# Patient Record
Sex: FEMALE | Race: WHITE | NOT HISPANIC OR LATINO | Employment: FULL TIME | URBAN - METROPOLITAN AREA
[De-identification: names, ages, dates, MRNs, and addresses within clinical notes are randomized per-mention and may not be internally consistent; named-entity substitution may affect disease eponyms.]

---

## 2019-12-18 ENCOUNTER — TELEPHONE (OUTPATIENT)
Dept: SCHEDULING | Facility: IMAGING CENTER | Age: 39
End: 2019-12-18

## 2019-12-30 ENCOUNTER — OFFICE VISIT (OUTPATIENT)
Dept: MEDICAL GROUP | Facility: PHYSICIAN GROUP | Age: 39
End: 2019-12-30
Payer: COMMERCIAL

## 2019-12-30 VITALS
RESPIRATION RATE: 18 BRPM | BODY MASS INDEX: 29.3 KG/M2 | SYSTOLIC BLOOD PRESSURE: 132 MMHG | TEMPERATURE: 98 F | HEIGHT: 62 IN | WEIGHT: 159.2 LBS | OXYGEN SATURATION: 95 % | DIASTOLIC BLOOD PRESSURE: 90 MMHG | HEART RATE: 75 BPM

## 2019-12-30 DIAGNOSIS — J02.9 PHARYNGITIS, UNSPECIFIED ETIOLOGY: ICD-10-CM

## 2019-12-30 DIAGNOSIS — Z23 NEED FOR VACCINATION: ICD-10-CM

## 2019-12-30 DIAGNOSIS — Z12.31 ENCOUNTER FOR SCREENING MAMMOGRAM FOR BREAST CANCER: ICD-10-CM

## 2019-12-30 DIAGNOSIS — Z00.00 ANNUAL PHYSICAL EXAM: ICD-10-CM

## 2019-12-30 DIAGNOSIS — R59.0 ANTERIOR CERVICAL ADENOPATHY: ICD-10-CM

## 2019-12-30 DIAGNOSIS — H61.23 BILATERAL IMPACTED CERUMEN: ICD-10-CM

## 2019-12-30 DIAGNOSIS — F41.9 ANXIETY: ICD-10-CM

## 2019-12-30 LAB
HETEROPH AB SER QL LA: NORMAL
INT CON NEG: NEGATIVE
INT CON NEG: NEGATIVE
INT CON POS: POSITIVE
INT CON POS: POSITIVE
S PYO AG THROAT QL: NORMAL

## 2019-12-30 PROCEDURE — 86308 HETEROPHILE ANTIBODY SCREEN: CPT | Performed by: NURSE PRACTITIONER

## 2019-12-30 PROCEDURE — 99204 OFFICE O/P NEW MOD 45 MIN: CPT | Performed by: NURSE PRACTITIONER

## 2019-12-30 PROCEDURE — 87880 STREP A ASSAY W/OPTIC: CPT | Performed by: NURSE PRACTITIONER

## 2019-12-30 RX ORDER — CETIRIZINE HYDROCHLORIDE 10 MG/1
10 TABLET ORAL DAILY
COMMUNITY

## 2019-12-30 SDOH — HEALTH STABILITY: MENTAL HEALTH: HOW OFTEN DO YOU HAVE 6 OR MORE DRINKS ON ONE OCCASION?: MONTHLY

## 2019-12-30 SDOH — HEALTH STABILITY: MENTAL HEALTH: HOW MANY STANDARD DRINKS CONTAINING ALCOHOL DO YOU HAVE ON A TYPICAL DAY?: 1 OR 2

## 2019-12-30 SDOH — HEALTH STABILITY: PHYSICAL HEALTH: ON AVERAGE, HOW MANY DAYS PER WEEK DO YOU ENGAGE IN MODERATE TO STRENUOUS EXERCISE (LIKE A BRISK WALK)?: 7 DAYS

## 2019-12-30 SDOH — HEALTH STABILITY: MENTAL HEALTH: HOW OFTEN DO YOU HAVE A DRINK CONTAINING ALCOHOL?: MONTHLY OR LESS

## 2019-12-30 SDOH — HEALTH STABILITY: PHYSICAL HEALTH: ON AVERAGE, HOW MANY MINUTES DO YOU ENGAGE IN EXERCISE AT THIS LEVEL?: 20 MIN

## 2019-12-30 ASSESSMENT — ANXIETY QUESTIONNAIRES
5. BEING SO RESTLESS THAT IT IS HARD TO SIT STILL: MORE THAN HALF THE DAYS
4. TROUBLE RELAXING: MORE THAN HALF THE DAYS
7. FEELING AFRAID AS IF SOMETHING AWFUL MIGHT HAPPEN: SEVERAL DAYS
1. FEELING NERVOUS, ANXIOUS, OR ON EDGE: SEVERAL DAYS
2. NOT BEING ABLE TO STOP OR CONTROL WORRYING: MORE THAN HALF THE DAYS
GAD7 TOTAL SCORE: 11
3. WORRYING TOO MUCH ABOUT DIFFERENT THINGS: MORE THAN HALF THE DAYS
6. BECOMING EASILY ANNOYED OR IRRITABLE: SEVERAL DAYS

## 2019-12-30 ASSESSMENT — ENCOUNTER SYMPTOMS
PALPITATIONS: 0
CHILLS: 0
NERVOUS/ANXIOUS: 1
ABDOMINAL PAIN: 0
DEPRESSION: 0
BLURRED VISION: 0
FEVER: 0
SORE THROAT: 1
BLOOD IN STOOL: 0
SHORTNESS OF BREATH: 0

## 2019-12-30 ASSESSMENT — PATIENT HEALTH QUESTIONNAIRE - PHQ9: CLINICAL INTERPRETATION OF PHQ2 SCORE: 0

## 2019-12-30 NOTE — ASSESSMENT & PLAN NOTE
This is a new problem. Onset began over 2 months ago. Her symptoms have been constant. Her symptoms include sore throat, ear congestion, fatigue, and enlarged lymph nodes. She denies fever, chills, or weight loss. She does have hx of seasonal allergies which she treats with OTC Zyrtec and benadryl.

## 2019-12-30 NOTE — LETTER
[facility]  ZEINA Hall  [address]  [facility] [address]  Fax: [phone]   Authorization for Release/Disclosure of   Protected Health Information   Name: REESE AMADO : 1980 SSN: [id number]   Address: [address] [place] [address] Phone:    [phone] (home)    I authorize the entity listed below to release/disclose the PHI below to:   [facility]/ZEINA Hall and ZEINA Hall   Provider or Entity Name:     Address   City, State, Zip   Phone:      Fax:     Reason for request: continuity of care   Information to be released:    [  ] LAST COLONOSCOPY,  including any PATH REPORT and follow-up  [  ] LAST FIT/COLOGUARD RESULT [  ] LAST DEXA  [  ] LAST MAMMOGRAM  [  ] LAST PAP  [  ] LAST LABS [  ] RETINA EXAM REPORT  [  ] IMMUNIZATION RECORDS  [  ] Release all info      [  ] Check here and initial the line next to each item to release ALL health information INCLUDING  _____ Care and treatment for drug and / or alcohol abuse  _____ HIV testing, infection status, or AIDS  _____ Genetic Testing    DATES OF SERVICE OR TIME PERIOD TO BE DISCLOSED: _____________  I understand and acknowledge that:  * This Authorization may be revoked at any time by you in writing, except if your health information has already been used or disclosed.  * Your health information that will be used or disclosed as a result of you signing this authorization could be re-disclosed by the recipient. If this occurs, your re-disclosed health information may no longer be protected by State or Federal laws.  * You may refuse to sign this Authorization. Your refusal will not affect your ability to obtain treatment.  * This Authorization becomes effective upon signing and will  on (date) __________.      If no date is indicated, this Authorization will  one (1) year from the signature date.    Name: Reese Amado    Signature:   Date:     2019            PLEASE FAX REQUESTED RECORDS BACK TO: [phone]

## 2019-12-30 NOTE — ASSESSMENT & PLAN NOTE
This is a new problem. Onset began approx 6 months ago. Symptoms include excessive worrying and racing thoughts. Her symptoms are intermittent in nature. She is unsure what may have brought on her symptoms. Aggravated by in social situations/crowded environments. She has been managing symptoms through physical activity and prayer. She does reports history of postpartum depression that was managed with antidepressant. She is not interested in medication management.

## 2019-12-30 NOTE — ASSESSMENT & PLAN NOTE
This is a new problem. Onset began over 2 months ago. Her symptoms have been constant. Associated symptoms include ear congestion and hearing loss, sore throat. She hasn't tried anything for her symptoms.

## 2019-12-30 NOTE — PROGRESS NOTES
New Patient appointment    Trisha Amado is a 39 y.o. female here to establish care. Her previous PCP was none. She presents with the following concerns:    HPI:      Anterior cervical adenopathy  This is a new problem. Onset began over 2 months ago. Her symptoms have been constant. Associated symptoms include ear congestion and hearing loss, sore throat. She hasn't tried anything for her symptoms.    Anxiety  This is a new problem. Onset began approx 6 months ago. Symptoms include excessive worrying and racing thoughts. Her symptoms are intermittent in nature. She is unsure what may have brought on her symptoms. Aggravated by in social situations/crowded environments. She has been managing symptoms through physical activity and prayer. She does reports history of postpartum depression that was managed with antidepressant. She is not interested in medication management.    Pharyngitis  This is a new problem. Onset began over 2 months ago. Her symptoms have been constant. Her symptoms include sore throat, ear congestion, fatigue, and enlarged lymph nodes. She denies fever, chills, or weight loss. She does have hx of seasonal allergies which she treats with OTC Zyrtec and benadryl.     Current medicines (including changes today)  Current Outpatient Medications   Medication Sig Dispense Refill   • cetirizine (ZYRTEC) 10 MG Tab Take 10 mg by mouth every day.     • diphenhydrAMINE HCl (BENADRYL ALLERGY PO) Take 1 Tab by mouth every bedtime.     • Levonorgestrel (MIRENA, 52 MG, IU) by Intrauterine route.     • tetanus-dipth-acell pertussis (ADACEL) 5-2-15.5 LF-MCG/0.5 Suspension 0.5 mL by Intramuscular route Once PRN for up to 1 dose. 0.5 mL 0   • pneumococcal vaccine (PNEUMOVAX 23) 25 MCG/0.5ML Injection 0.5 mL by Intramuscular route Once PRN for up to 1 dose. 1 Vial 0     No current facility-administered medications for this visit.      She  has no past medical history on file.  She  has a past surgical history that  includes gyn surgery; appendectomy; and primary c section.  Social History     Tobacco Use   • Smoking status: Former Smoker     Packs/day: 0.50     Years: 20.00     Pack years: 10.00     Types: Cigarettes     Last attempt to quit: 2018     Years since quittin.0   • Smokeless tobacco: Never Used   Substance Use Topics   • Alcohol use: Yes     Frequency: Monthly or less     Drinks per session: 1 or 2     Binge frequency: Monthly     Comment: Rare   • Drug use: No     Social History     Patient does not qualify to have social determinant information on file (likely too young).   Social History Narrative   • Not on file     Family History   Problem Relation Age of Onset   • Depression Mother    • Depression Father    • No Known Problems Sister    • No Known Problems Brother    • Heart Disease Maternal Grandmother    • Arterial Aneurysm Maternal Grandfather    • Alzheimer's Disease Paternal Grandmother    • COPD Paternal Grandfather    • Lung Disease Paternal Grandfather    • No Known Problems Sister    • Other Brother         CP   • No Known Problems Daughter    • No Known Problems Son      No family status information on file.       Review of Systems   Constitutional: Positive for malaise/fatigue. Negative for chills and fever.   HENT: Positive for congestion, hearing loss and sore throat. Negative for ear discharge and ear pain.    Eyes: Negative for blurred vision.        She wears glasses   Respiratory: Negative for shortness of breath.    Cardiovascular: Negative for chest pain and palpitations.   Gastrointestinal: Negative for abdominal pain and blood in stool.   Psychiatric/Behavioral: Negative for depression and suicidal ideas. The patient is nervous/anxious.      All other systems reviewed and are negative    KARLEY 7 2019   Total Score 11       Interpretation of KARLEY 7 Total Score   Score Severity:  0-4 No Anxiety   5-9 Mild Anxiety  10-14 Moderate Anxiety  15-21 Severe Anxiety       Objective:  "    /90   Pulse 75   Temp 36.7 °C (98 °F) (Temporal)   Resp 18   Ht 1.575 m (5' 2\")   Wt 72.2 kg (159 lb 3.2 oz)   SpO2 95%  Body mass index is 29.12 kg/m².    Physical Exam:  Constitutional: Oriented to person, place, and time and well-developed, well-nourished, and in no distress.   HENT:   Head: Normocephalic and atraumatic.   Right Ear: Unable to visualize tympanic membrane due to cerumen impaction. External ear normal.   Left Ear: Unable to visualize tympanic membrane due to cerumen impaction. External ear normal.   Mouth/Throat: Oropharynx is clear and moist and mucous membranes are normal. No oropharyngeal exudate. Posterior oropharyngeal erythema present.   Eyes: Conjunctivae and EOM are normal. Pupils are equal, round, and reactive to light. She is wearing corrective glasses.  Neck: Normal range of motion. Neck supple. No thyromegaly present.   Cardiovascular: Normal rate, regular rhythm, normal heart sounds. Radial pulses intact. Exam reveals no friction rub. No murmur heard.  Pulmonary/Chest: Effort normal and breath sounds normal. No respiratory distress or use of accessory muscles. No wheezes, rhonchi, or rales.   Abdominal: Soft. Bowel sounds are normal. Exhibits no distension and no mass. There is no tenderness. No hepatosplenomegaly.    Musculoskeletal: Full range of motion. No deformity or swelling of joints. DTRs intact.   Lymphadenopathy:     Single pea-size lymph node palpated left anterior neck.  Neurological: Alert and oriented to person, place, and time. Gait normal.   Skin: Skin is warm and dry. No cyanosis. No edema.  Psychiatric: Mood, memory, affect and judgment normal.     Assessment and Plan:   The following treatment plan was discussed    1. Pharyngitis, unspecified etiology  Rapid strep and mono test negative. She declined strep culture. Her symptoms are likely viral or allergen etiology. Will continue to monitor. Continue taking OTC antihistamine.  - POCT Rapid Strep A  - " POCT Mononucleosis (mono)    2. Anterior cervical adenopathy  Labs ordered to determine infectious vs. Inflammatory process. Will continue to monitor.  - POCT Rapid Strep A  - POCT Mononucleosis (mono)    3. Anxiety  Labs ordered to r/o organic etiology, including thyroid disease. Will determine treatment plan based on these results    4. Bilateral impacted cerumen  Will reschedule patient for ear lavage.     5. Need for vaccination  She was given prescription to obtain at preferred pharmacy.  - tetanus-dipth-acell pertussis (ADACEL) 5-2-15.5 LF-MCG/0.5 Suspension; 0.5 mL by Intramuscular route Once PRN for up to 1 dose.  Dispense: 0.5 mL; Refill: 0  - pneumococcal vaccine (PNEUMOVAX 23) 25 MCG/0.5ML Injection; 0.5 mL by Intramuscular route Once PRN for up to 1 dose.  Dispense: 1 Vial; Refill: 0    6. Encounter for screening mammogram for breast cancer  - MA-SCREEN MAMMO W/CAD-BILAT; Future    7. Annual physical exam  Routine screening labs ordered.  - Comp Metabolic Panel; Future  - HEMOGLOBIN A1C; Future  - CBC WITH DIFFERENTIAL; Future  - Lipid Profile; Future  - TSH; Future  - FREE THYROXINE; Future    Records requested from OB/GYN specialist.    Followup: Return in about 1 week (around 1/6/2020), or if symptoms worsen or fail to improve, for For follow-up on labs/ ear lavage .

## 2020-01-08 ENCOUNTER — OFFICE VISIT (OUTPATIENT)
Dept: MEDICAL GROUP | Facility: PHYSICIAN GROUP | Age: 40
End: 2020-01-08
Payer: COMMERCIAL

## 2020-01-08 VITALS
BODY MASS INDEX: 29.26 KG/M2 | HEART RATE: 68 BPM | DIASTOLIC BLOOD PRESSURE: 72 MMHG | SYSTOLIC BLOOD PRESSURE: 122 MMHG | TEMPERATURE: 97.8 F | RESPIRATION RATE: 16 BRPM | HEIGHT: 62 IN | OXYGEN SATURATION: 100 % | WEIGHT: 159 LBS

## 2020-01-08 DIAGNOSIS — H61.23 BILATERAL IMPACTED CERUMEN: ICD-10-CM

## 2020-01-08 DIAGNOSIS — E78.2 MIXED HYPERLIPIDEMIA: ICD-10-CM

## 2020-01-08 PROBLEM — J02.9 PHARYNGITIS: Status: RESOLVED | Noted: 2019-12-30 | Resolved: 2020-01-08

## 2020-01-08 PROCEDURE — 99213 OFFICE O/P EST LOW 20 MIN: CPT | Performed by: NURSE PRACTITIONER

## 2020-01-08 ASSESSMENT — PATIENT HEALTH QUESTIONNAIRE - PHQ9: CLINICAL INTERPRETATION OF PHQ2 SCORE: 0

## 2020-01-08 ASSESSMENT — ENCOUNTER SYMPTOMS
CHILLS: 0
FEVER: 0

## 2020-01-08 NOTE — ASSESSMENT & PLAN NOTE
Impaction located both ears that was found with physical examination performed one week ago. Patient is experiencing hearing loss and ear congestion.

## 2020-01-08 NOTE — PROGRESS NOTES
Subjective:   Trisha Amado is a 39 y.o. female here today for follow up on lab work and cerumen impaction.    Mixed hyperlipidemia  Lab results drawn on 1/3/2020 show elevated total cholesterol of 249, HDL 76, triglycerides 70, and . She does admit to poor eating habits high in saturated fats and low in fruits and vegetables. She has recently implement dietary changes this week.     Bilateral impacted cerumen  Impaction located both ears that was found with physical examination performed one week ago. Patient is experiencing hearing loss and ear congestion.     Current medicines (including changes today)  Current Outpatient Medications   Medication Sig Dispense Refill   • cetirizine (ZYRTEC) 10 MG Tab Take 10 mg by mouth every day.     • diphenhydrAMINE HCl (BENADRYL ALLERGY PO) Take 1 Tab by mouth every bedtime.     • Levonorgestrel (MIRENA, 52 MG, IU) by Intrauterine route.     • tetanus-dipth-acell pertussis (ADACEL) 5-2-15.5 LF-MCG/0.5 Suspension 0.5 mL by Intramuscular route Once PRN for up to 1 dose. 0.5 mL 0   • pneumococcal vaccine (PNEUMOVAX 23) 25 MCG/0.5ML Injection 0.5 mL by Intramuscular route Once PRN for up to 1 dose. 1 Vial 0     No current facility-administered medications for this visit.      She  has no past medical history on file.    Social History     Socioeconomic History   • Marital status:      Spouse name: Not on file   • Number of children: Not on file   • Years of education: Not on file   • Highest education level: Not on file   Occupational History     Comment:    Social Needs   • Financial resource strain: Not on file   • Food insecurity:     Worry: Not on file     Inability: Not on file   • Transportation needs:     Medical: Not on file     Non-medical: Not on file   Tobacco Use   • Smoking status: Former Smoker     Packs/day: 0.50     Years: 20.00     Pack years: 10.00     Types: Cigarettes     Last attempt to quit: 12/30/2018     Years since  "quittin.0   • Smokeless tobacco: Never Used   Substance and Sexual Activity   • Alcohol use: Yes     Frequency: Monthly or less     Drinks per session: 1 or 2     Binge frequency: Monthly     Comment: Rare   • Drug use: No   • Sexual activity: Yes     Birth control/protection: I.U.D.     Comment: ; 10 years. Mirena IUD placed 2019   Lifestyle   • Physical activity:     Days per week: 7 days     Minutes per session: 20 min   • Stress: Not on file   Relationships   • Social connections:     Talks on phone: Not on file     Gets together: Not on file     Attends Pentecostalism service: Not on file     Active member of club or organization: Not on file     Attends meetings of clubs or organizations: Not on file     Relationship status: Not on file   • Intimate partner violence:     Fear of current or ex partner: Not on file     Emotionally abused: Not on file     Physically abused: Not on file     Forced sexual activity: Not on file   Other Topics Concern   • Not on file   Social History Narrative   • Not on file       Review of Systems   Constitutional: Negative for chills and fever.   HENT: Positive for congestion, hearing loss and tinnitus. Negative for ear discharge and ear pain.         Objective:     /72 (BP Location: Right arm, Patient Position: Sitting)   Pulse 68   Temp 36.6 °C (97.8 °F)   Resp 16   Ht 1.575 m (5' 2\")   Wt 72.1 kg (159 lb)   SpO2 100%  Body mass index is 29.08 kg/m².     Physical Exam:  Constitutional: Oriented to person, place, and time and well-developed, well-nourished, and in no distress.   HENT:   Head: Normocephalic and atraumatic.   Right Ear: Unable to visualize tympanic membrane due to cerumen impaction.  External ear normal.   Left Ear: Unable to visualize tympanic membrane due to cerumen impaction. External ear normal.   Eyes: Conjunctivae and EOM are normal. Pupils are equal, round, and reactive to light.   Neck: Normal range of motion. Neck supple.   Neurological: " Alert and oriented to person, place, and time. Gait normal.   Psychiatric: Mood, memory, affect and judgment normal.       Assessment and Plan:   The following treatment plan was discussed    1. Mixed hyperlipidemia  Reviewed labs with patient.  Strongly encouraged lifestyle changes including low saturated fat and high fiber diet, along with regular physical activity.  Due to elevated ALT, we will plan to repeat lipid panel in 3 months.  - Lipid Profile; Future    2. Bilateral impacted cerumen  Ear lavage performed with successful removal of cerumen bilaterally.  Recommend that patient use over-the-counter mineral oil with cottonball for 20 min weekly to prevent further impaction.      Followup: Return if symptoms worsen or fail to improve.

## 2020-01-08 NOTE — ASSESSMENT & PLAN NOTE
Lab results drawn on 1/3/2020 show elevated total cholesterol of 249, HDL 76, triglycerides 70, and . She does admit to poor eating habits high in saturated fats and low in fruits and vegetables. She has recently implement dietary changes this week.

## 2020-01-14 ENCOUNTER — TELEPHONE (OUTPATIENT)
Dept: MEDICAL GROUP | Facility: PHYSICIAN GROUP | Age: 40
End: 2020-01-14

## 2020-01-14 NOTE — TELEPHONE ENCOUNTER
Attempted to contact patient regarding upcoming visit and symptoms. Advised to have patient contact office.    ZEINA Hall,NISH-C     Universal Safety Interventions

## 2020-01-15 ENCOUNTER — OFFICE VISIT (OUTPATIENT)
Dept: MEDICAL GROUP | Facility: PHYSICIAN GROUP | Age: 40
End: 2020-01-15
Payer: COMMERCIAL

## 2020-01-15 VITALS
RESPIRATION RATE: 16 BRPM | BODY MASS INDEX: 29.22 KG/M2 | DIASTOLIC BLOOD PRESSURE: 78 MMHG | OXYGEN SATURATION: 94 % | WEIGHT: 158.8 LBS | HEIGHT: 62 IN | TEMPERATURE: 97.4 F | SYSTOLIC BLOOD PRESSURE: 110 MMHG | HEART RATE: 67 BPM

## 2020-01-15 DIAGNOSIS — J01.90 ACUTE BACTERIAL SINUSITIS: ICD-10-CM

## 2020-01-15 DIAGNOSIS — J02.9 PHARYNGITIS, UNSPECIFIED ETIOLOGY: ICD-10-CM

## 2020-01-15 DIAGNOSIS — B96.89 ACUTE BACTERIAL SINUSITIS: ICD-10-CM

## 2020-01-15 LAB
INT CON NEG: NORMAL
INT CON POS: NORMAL
S PYO AG THROAT QL: NEGATIVE

## 2020-01-15 PROCEDURE — 87880 STREP A ASSAY W/OPTIC: CPT | Performed by: NURSE PRACTITIONER

## 2020-01-15 PROCEDURE — 99214 OFFICE O/P EST MOD 30 MIN: CPT | Performed by: NURSE PRACTITIONER

## 2020-01-15 RX ORDER — AMOXICILLIN AND CLAVULANATE POTASSIUM 875; 125 MG/1; MG/1
1 TABLET, FILM COATED ORAL 2 TIMES DAILY
Qty: 14 TAB | Refills: 0 | Status: SHIPPED | OUTPATIENT
Start: 2020-01-15 | End: 2020-01-22

## 2020-01-15 RX ORDER — FLUTICASONE PROPIONATE 50 MCG
1-2 SPRAY, SUSPENSION (ML) NASAL 2 TIMES DAILY PRN
Qty: 1 BOTTLE | Refills: 1 | Status: SHIPPED | OUTPATIENT
Start: 2020-01-15 | End: 2023-07-18

## 2020-01-15 ASSESSMENT — ENCOUNTER SYMPTOMS
TROUBLE SWALLOWING: 0
SHORTNESS OF BREATH: 0
SINUS PRESSURE: 1
FEVER: 0
SINUS PAIN: 1
SORE THROAT: 1
CHILLS: 0
COUGH: 0
SPUTUM PRODUCTION: 0
HEADACHES: 1
VOMITING: 0

## 2020-01-15 NOTE — PROGRESS NOTES
Subjective:   Trisha Amado is a 39 y.o. female here today for c/o pharyngitis and sinus congestion.     Pharyngitis    This is a new problem. Episode onset: Over 2 weeks ago. The problem has been waxing and waning. There has been no fever. The pain is moderate. Associated symptoms include congestion and headaches. Pertinent negatives include no coughing, ear pain, shortness of breath, trouble swallowing or vomiting. She has tried NSAIDs for the symptoms. The treatment provided mild relief.   Sinus Problem   This is a new problem. The current episode started in the past 7 days. The problem has been gradually worsening since onset. There has been no fever. The pain is moderate. Associated symptoms include congestion, headaches, sinus pressure and a sore throat. Pertinent negatives include no chills, coughing, ear pain or shortness of breath. Treatments tried: ibuprofen. The treatment provided no relief.       Current medicines (including changes today)  Current Outpatient Medications   Medication Sig Dispense Refill   • fluticasone (FLONASE) 50 MCG/ACT nasal spray Spray 1-2 Sprays in nose 2 times a day as needed. 1 Bottle 1   • amoxicillin-clavulanate (AUGMENTIN) 875-125 MG Tab Take 1 Tab by mouth 2 times a day for 7 days. 14 Tab 0   • cetirizine (ZYRTEC) 10 MG Tab Take 10 mg by mouth every day.     • diphenhydrAMINE HCl (BENADRYL ALLERGY PO) Take 1 Tab by mouth every bedtime.     • Levonorgestrel (MIRENA, 52 MG, IU) by Intrauterine route.     • tetanus-dipth-acell pertussis (ADACEL) 5-2-15.5 LF-MCG/0.5 Suspension 0.5 mL by Intramuscular route Once PRN for up to 1 dose. 0.5 mL 0   • pneumococcal vaccine (PNEUMOVAX 23) 25 MCG/0.5ML Injection 0.5 mL by Intramuscular route Once PRN for up to 1 dose. 1 Vial 0     No current facility-administered medications for this visit.      She  has no past medical history on file.    Social History     Socioeconomic History   • Marital status:      Spouse name: Not on file   •  Number of children: Not on file   • Years of education: Not on file   • Highest education level: Not on file   Occupational History     Comment:    Social Needs   • Financial resource strain: Not on file   • Food insecurity:     Worry: Not on file     Inability: Not on file   • Transportation needs:     Medical: Not on file     Non-medical: Not on file   Tobacco Use   • Smoking status: Former Smoker     Packs/day: 0.50     Years: 20.00     Pack years: 10.00     Types: Cigarettes     Last attempt to quit: 2018     Years since quittin.0   • Smokeless tobacco: Never Used   Substance and Sexual Activity   • Alcohol use: Yes     Frequency: Monthly or less     Drinks per session: 1 or 2     Binge frequency: Monthly     Comment: Rare   • Drug use: No   • Sexual activity: Yes     Birth control/protection: I.U.D.     Comment: ; 10 years. Mirena IUD placed 2019   Lifestyle   • Physical activity:     Days per week: 7 days     Minutes per session: 20 min   • Stress: Not on file   Relationships   • Social connections:     Talks on phone: Not on file     Gets together: Not on file     Attends Zoroastrian service: Not on file     Active member of club or organization: Not on file     Attends meetings of clubs or organizations: Not on file     Relationship status: Not on file   • Intimate partner violence:     Fear of current or ex partner: Not on file     Emotionally abused: Not on file     Physically abused: Not on file     Forced sexual activity: Not on file   Other Topics Concern   • Not on file   Social History Narrative   • Not on file       Review of Systems   Constitutional: Positive for malaise/fatigue. Negative for chills and fever.   HENT: Positive for congestion, sinus pressure, sinus pain and sore throat. Negative for ear pain and trouble swallowing.    Respiratory: Negative for cough, sputum production and shortness of breath.    Cardiovascular: Negative for chest pain.  "  Gastrointestinal: Negative for vomiting.   Neurological: Positive for headaches.        Objective:     /78   Pulse 67   Temp 36.3 °C (97.4 °F) (Temporal)   Resp 16   Ht 1.575 m (5' 2\")   Wt 72 kg (158 lb 12.8 oz)   SpO2 94%  Body mass index is 29.04 kg/m².     Physical Exam:  Constitutional: Oriented to person, place, and time and well-developed, well-nourished, and in no distress.   HENT:   Head: Normocephalic and atraumatic.   Right Ear: Tympanic membrane and external ear normal.   Left Ear: Tympanic membrane and external ear normal.   Mouth/Throat: Oropharynx is clear and moist and mucous membranes are normal. No oropharyngeal exudate. Posterior oropharyngeal erythema.   Nose: nasal turbinates red and swollen.  Eyes: Conjunctivae and EOM are normal. Pupils are equal, round, and reactive to light.   Neck: Normal range of motion. Neck supple.  Cardiovascular: Normal rate, regular rhythm, normal heart sounds. Exam reveals no friction rub. No murmur heard.  Pulmonary/Chest: Effort normal and breath sounds normal. No respiratory distress or use of accessory muscles. No wheezes, rhonchi, or rales.   Lymphadenopathy:     No cervical adenopathy.   Neurological: Alert and oriented to person, place, and time.  Skin: Skin is warm and dry. No cyanosis. No edema.  Psychiatric: Mood, memory, affect and judgment normal.       Assessment and Plan:   The following treatment plan was discussed    1. Acute bacterial sinusitis  She was prescribed antibiotics x 7 days and flonase prn. I also recommend OTC oral decongestant, saline nasal rinse or netti pot, saline nasal spay, Vit C 1000mg daily. I also recommend probiotic daily during antibiotic treatment. Work note provided.  - fluticasone (FLONASE) 50 MCG/ACT nasal spray; Spray 1-2 Sprays in nose 2 times a day as needed.  Dispense: 1 Bottle; Refill: 1  - amoxicillin-clavulanate (AUGMENTIN) 875-125 MG Tab; Take 1 Tab by mouth 2 times a day for 7 days.  Dispense: 14 Tab; " Refill: 0    2. Pharyngitis, unspecified etiology  Rapid strep was negative. Symptoms most likely viral in nature. Explained that this is a self-limiting illness  usually lasting 7-10 days. Recommend symptomatic treatment, including tylenol or ibuprofen for pain/fever, decongestant, saline nasal spray, vitamin C, and humidifier at bedside. Increase fluids and get plenty of rest.    - POCT Rapid Strep A    Followup: Return if symptoms worsen or fail to improve.

## 2020-01-15 NOTE — LETTER
January 15, 2020         Patient: Trisha Amado   YOB: 1980   Date of Visit: 1/15/2020           To Whom it May Concern:    Trisha Amado was seen in my clinic on 1/15/2020. Please excuse her absence from work today 1/15/20. She may return to work on 1/16/20.    If you have any questions or concerns, please don't hesitate to call.        Sincerely,           ANDRE Hall.  Electronically Signed

## 2020-10-08 ENCOUNTER — NURSE TRIAGE (OUTPATIENT)
Dept: HEALTH INFORMATION MANAGEMENT | Facility: OTHER | Age: 40
End: 2020-10-08

## 2020-10-08 NOTE — TELEPHONE ENCOUNTER
Regarding: ADVICE  ----- Message from Amanda Merlino sent at 10/8/2020  8:10 AM PDT -----  GAYE LIRIANO PT/ [facility]/ SOME SWELLING IN  LOWER LEG/ PEBP   JUST NOTICE SWELLING TODAY- NEW

## 2020-10-08 NOTE — TELEPHONE ENCOUNTER
"  Reason for Disposition  • Cast on leg or ankle and has increasing pain    Additional Information  • Negative: Sounds like a life-threatening emergency to the triager  • Negative: Chest pain  • Negative: Small area of swelling and followed an insect bite to the area  • Negative: Followed a knee injury  • Negative: Ankle or foot injury  • Negative: Pregnant with leg swelling or edema  • Negative: Difficulty breathing at rest  • Negative: Entire foot is cool or blue in comparison to other side    Answer Assessment - Initial Assessment Questions  1. ONSET: \"When did the swelling start?\" (e.g., minutes, hours, days)      This am  2. LOCATION: \"What part of the leg is swollen?\"  \"Are both legs swollen or just one leg?\"      Left leg  3. SEVERITY: \"How bad is the swelling?\" (e.g., localized; mild, moderate, severe)   - Localized - small area of swelling localized to one leg   - MILD pedal edema - swelling limited to foot and ankle, pitting edema < 1/4 inch (6 mm) deep, rest and elevation eliminate most or all swelling   - MODERATE edema - swelling of lower leg to knee, pitting edema > 1/4 inch (6 mm) deep, rest and elevation only partially reduce swelling   - SEVERE edema - swelling extends above knee, facial or hand swelling present       moderate  4. REDNESS: \"Does the swelling look red or infected?\"      no  5. PAIN: \"Is the swelling painful to touch?\" If so, ask: \"How painful is it?\"   (Scale 1-10; mild, moderate or severe)      4  6. FEVER: \"Do you have a fever?\" If so, ask: \"What is it, how was it measured, and when did it start?\"       no  7. CAUSE: \"What do you think is causing the leg swelling?\"      unknown  8. MEDICAL HISTORY: \"Do you have a history of heart failure, kidney disease, liver failure, or cancer?\"      no  9. RECURRENT SYMPTOM: \"Have you had leg swelling before?\" If so, ask: \"When was the last time?\" \"What happened that time?\"      no  10. OTHER SYMPTOMS: \"Do you have any other symptoms?\" (e.g., " "chest pain, difficulty breathing)        no  11. PREGNANCY: \"Is there any chance you are pregnant?\" \"When was your last menstrual period?\"        no    Protocols used: LEG SWELLING AND EDEMA-A-OH    "

## 2020-11-24 ENCOUNTER — OFFICE VISIT (OUTPATIENT)
Dept: MEDICAL GROUP | Facility: PHYSICIAN GROUP | Age: 40
End: 2020-11-24
Payer: COMMERCIAL

## 2020-11-24 VITALS
HEART RATE: 59 BPM | WEIGHT: 157.2 LBS | DIASTOLIC BLOOD PRESSURE: 70 MMHG | RESPIRATION RATE: 12 BRPM | HEIGHT: 62 IN | BODY MASS INDEX: 28.93 KG/M2 | OXYGEN SATURATION: 97 % | SYSTOLIC BLOOD PRESSURE: 130 MMHG | TEMPERATURE: 99.2 F

## 2020-11-24 DIAGNOSIS — F41.0 PANIC ATTACK DUE TO EXCEPTIONAL STRESS: ICD-10-CM

## 2020-11-24 DIAGNOSIS — Z00.00 PHYSICAL EXAM, ANNUAL: ICD-10-CM

## 2020-11-24 DIAGNOSIS — J45.990 MILD EXERCISE-INDUCED ASTHMA: ICD-10-CM

## 2020-11-24 DIAGNOSIS — R07.89 OTHER CHEST PAIN: ICD-10-CM

## 2020-11-24 DIAGNOSIS — F43.0 PANIC ATTACK DUE TO EXCEPTIONAL STRESS: ICD-10-CM

## 2020-11-24 PROCEDURE — 99214 OFFICE O/P EST MOD 30 MIN: CPT | Performed by: PHYSICIAN ASSISTANT

## 2020-11-24 PROCEDURE — 93000 ELECTROCARDIOGRAM COMPLETE: CPT | Performed by: PHYSICIAN ASSISTANT

## 2020-11-24 RX ORDER — ALBUTEROL SULFATE 90 UG/1
2 AEROSOL, METERED RESPIRATORY (INHALATION) EVERY 6 HOURS PRN
Qty: 1 EACH | Refills: 0 | Status: SHIPPED | OUTPATIENT
Start: 2020-11-24 | End: 2020-12-24

## 2020-11-24 RX ORDER — ALPRAZOLAM 0.5 MG/1
0.5 TABLET ORAL 2 TIMES DAILY PRN
Qty: 30 TAB | Refills: 0 | Status: SHIPPED | OUTPATIENT
Start: 2020-11-24 | End: 2020-12-24

## 2020-11-24 NOTE — PROGRESS NOTES
CC:    Chief Complaint   Patient presents with   • Establish Care     1 month ago in hospital    • Asthma     walking, get short breath   • Menopause     gets anxiety, 6 months ago    • Contraception       HISTORY OF THE PRESENT ILLNESS: Patient is a 40 y.o. female presenting to [facility] primary care     1. Pt worried that she is going into menopause because she is having increased anxiety and irritability. She is thinking possibly menopause related because that is what her mother suggested. She is now working from home and balancing life with kids schooling at home. Had Mirena inserted 2 years ago. Does not get periods since having Mirena.   2. Pt had asthma as a kid but it went away. Wondering if it is coming back. She is getting very SOB when even just going for a walk. She is getting wheezing with exercise.   3. Pt is having some CP and palpitations. No hx of cardiac problems. It seems to typically co-inside with moments of acute anxiety.       Allergies: Aspirin    Current Outpatient Medications Ordered in Epic   Medication Sig Dispense Refill   • fluticasone (FLONASE) 50 MCG/ACT nasal spray Spray 1-2 Sprays in nose 2 times a day as needed. 1 Bottle 1   • cetirizine (ZYRTEC) 10 MG Tab Take 10 mg by mouth every day.     • diphenhydrAMINE HCl (BENADRYL ALLERGY PO) Take 1 Tab by mouth every bedtime.     • Levonorgestrel (MIRENA, 52 MG, IU) by Intrauterine route.       No current [facility]-ordered facility-administered medications on file.        History reviewed. No pertinent past medical history.    Past Surgical History:   Procedure Laterality Date   • APPENDECTOMY     • GYN SURGERY      c section x 2   • PRIMARY C SECTION      x2       Social History     Tobacco Use   • Smoking status: Former Smoker     Packs/day: 0.50     Years: 20.00     Pack years: 10.00     Types: Cigarettes     Quit date: 2018     Years since quittin.9   • Smokeless tobacco: Never Used   Substance Use Topics   • Alcohol use: Yes      Frequency: Monthly or less     Drinks per session: 1 or 2     Binge frequency: Monthly     Comment: Rare   • Drug use: No       Social History     Social History Narrative   • Not on file       Family History   Problem Relation Age of Onset   • Depression Mother    • Depression Father    • No Known Problems Sister    • No Known Problems Brother    • Heart Disease Maternal Grandmother    • Arterial Aneurysm Maternal Grandfather    • Alzheimer's Disease Paternal Grandmother    • COPD Paternal Grandfather    • Lung Disease Paternal Grandfather    • No Known Problems Sister    • Other Brother         CP   • No Known Problems Daughter    • No Known Problems Son        ROS:     - Constitutional: Negative for fever, chills, unexpected weight change, and fatigue/generalized weakness.     - HEENT: Negative for headaches, vision changes, hearing changes, ear pain, ear discharge, rhinorrhea, sinus congestion, sore throat, and neck pain.      - Respiratory: Positive for wheezing and shortness of breath with exertion.  Negative for cough, sputum production, chest congestion,, and crackles.      - Cardiovascular: Positive chest pain and palpitations.  Negative for  orthopnea, and bilateral lower extremity edema.     - Gastrointestinal: Negative for heartburn, nausea, vomiting, abdominal pain, hematochezia, melena, diarrhea, constipation, and greasy/foul-smelling stools.     - Genitourinary: Negative for dysuria, polyuria, hematuria, pyuria, urinary urgency, and urinary incontinence.     - Musculoskeletal: Negative for myalgias, back pain, and joint pain.     - Skin: Negative for rash, itching, cyanotic skin color change.     - Neurological: Negative for dizziness, tingling, tremors, focal sensory deficit, focal weakness and headaches.     - Endo/Heme/Allergies: Does not bruise/bleed easily.     - Psychiatric/Behavioral: Positive for anxiety.  Negative for depression, suicidal/homicidal ideation and memory loss.        - NOTE: All  "other systems reviewed and are negative, except as in HPI.      .      Exam: /70 (BP Location: Right arm, Patient Position: Sitting, BP Cuff Size: Adult)   Pulse (!) 59   Temp 37.3 °C (99.2 °F) (Temporal)   Resp 12   Ht 1.575 m (5' 2\")   Wt 71.3 kg (157 lb 3.2 oz)   SpO2 97%  Body mass index is 28.75 kg/m².    General: Normal appearing. No acute distress.  Skin: Warm and dry.  No obvious lesions.  HEENT: Normocephalic. Eyes conjunctiva clear lids without ptosis, ears normal shape and contour  Cardiovascular: Regular rate and rhythm without murmur.   Respiratory: Clear to auscultation bilaterally, no rhonchi wheezing or rales.  Neurologic: Grossly nonfocal, A&O x3, gait normal,  Musculoskeletal: No deformity or swelling.   Extremities: No extremity cyanosis, clubbing, or edema.  Psych: Pt emotionally upset, crying in room while discussing her situation. Judgment and insight is normal.    Please note that this dictation was created using voice recognition software. I have made every reasonable attempt to correct obvious errors, but I expect that there are errors of grammar and possibly content that I did not discover before finalizing the note.  EKG Interpretation   Interpreted by me   Rhythm: normal sinus   Rate: normal   Axis: normal   Ectopy: none   Conduction: normal   ST Segments: no acute change   T Waves: no acute change   Q Waves: none   Clinical Impression: no acute changes and normal EKG      Assessment/Plan  1. Physical exam, annual  Annual labs printed  - CBC WITH DIFFERENTIAL; Future  - Comp Metabolic Panel; Future  - Lipid Profile; Future  - TSH WITH REFLEX TO FT4; Future    2. Other chest pain  EKG normal. This is likely stemming from her increased anxiety. She is low risk for acute coronary syndrome. Go to ER if pain becomes more severe.   - EKG - Clinic Performed    3. Panic attack due to exceptional stress  I am going to start her on as needed Xanax for panic attacks. PDMP checked. Will " check back in 2 months to see if this is helping. We also discussed how she is likely having increased panic attacks from what sounds like burnout. The best medicine now would be to start making space and time for herself to rest.  - ALPRAZolam (XANAX) 0.5 MG Tab; Take 1 Tab by mouth 2 times a day as needed for Sleep or Anxiety for up to 30 days.  Dispense: 30 Tab; Refill: 0    4. Mild exercise-induced asthma  Take albuterol PRN for SOB  - albuterol 108 (90 Base) MCG/ACT Aero Soln inhalation aerosol; Inhale 2 Puffs every 6 hours as needed for Shortness of Breath for up to 30 days.  Dispense: 1 Each; Refill: 0

## 2020-12-22 ENCOUNTER — OFFICE VISIT (OUTPATIENT)
Dept: MEDICAL GROUP | Facility: PHYSICIAN GROUP | Age: 40
End: 2020-12-22
Payer: COMMERCIAL

## 2020-12-22 VITALS
HEIGHT: 61 IN | OXYGEN SATURATION: 94 % | TEMPERATURE: 98.5 F | DIASTOLIC BLOOD PRESSURE: 82 MMHG | SYSTOLIC BLOOD PRESSURE: 110 MMHG | WEIGHT: 161 LBS | BODY MASS INDEX: 30.4 KG/M2 | HEART RATE: 61 BPM | RESPIRATION RATE: 16 BRPM

## 2020-12-22 DIAGNOSIS — R10.11 RUQ PAIN: ICD-10-CM

## 2020-12-22 PROCEDURE — 99214 OFFICE O/P EST MOD 30 MIN: CPT | Performed by: NURSE PRACTITIONER

## 2020-12-23 NOTE — ASSESSMENT & PLAN NOTE
New problem. RUQ pain started x 1 wk ago. Pt started keto diet a few weeks prior to her symptoms.  Pain is intermittent, localized, nonradiating.  It sometimes wakes her up at night.  Pain gets worse with food ingestion, particularly fats, gets better with fasting and heat packs to the affected site. Taking NSAIDs OTC which are helping. She denies fever, chills, fatty stools, nausea, diarrhea.  No back pain, abdominal distention or jaundice, no hematochezia or melena.  Gallbladder intact.  Patient did not have a chance to complete her lab work yet, requesting print out of the lab orders.

## 2020-12-23 NOTE — PROGRESS NOTES
Chief Complaint   Patient presents with   • RUQ Pain      been dieting        HISTORY OF PRESENT ILLNESS: Patient is a 40 y.o. female, established patient who presents today to discuss medical problems as listed below:    RUQ pain  New problem. RUQ pain started x 1 wk ago. Pt started keto diet a few weeks prior to her symptoms.  Pain is intermittent, localized, nonradiating.  It sometimes wakes her up at night.  Pain gets worse with food ingestion, particularly fats gets better with fasting and heat packs to the affected site. Taking NSAIDs OTC which are helping. She denies fever, chills, fatty stools, nausea, diarrhea.  No abdominal distention or jaundice. Gallbladder intact.  Patient did not have a chance to complete her lab work yet, requesting print out of the lab orders.      Patient Active Problem List    Diagnosis Date Noted   • RUQ pain 12/22/2020   • Mixed hyperlipidemia 01/08/2020   • Bilateral impacted cerumen 01/08/2020   • Anterior cervical adenopathy 12/30/2019   • Anxiety 12/30/2019        Allergies: Aspirin    Current Outpatient Medications   Medication Sig Dispense Refill   • ALPRAZolam (XANAX) 0.5 MG Tab Take 1 Tab by mouth 2 times a day as needed for Sleep or Anxiety for up to 30 days. 30 Tab 0   • albuterol 108 (90 Base) MCG/ACT Aero Soln inhalation aerosol Inhale 2 Puffs every 6 hours as needed for Shortness of Breath for up to 30 days. 1 Each 0   • fluticasone (FLONASE) 50 MCG/ACT nasal spray Spray 1-2 Sprays in nose 2 times a day as needed. 1 Bottle 1   • cetirizine (ZYRTEC) 10 MG Tab Take 10 mg by mouth every day.     • diphenhydrAMINE HCl (BENADRYL ALLERGY PO) Take 1 Tab by mouth every bedtime.     • Levonorgestrel (MIRENA, 52 MG, IU) by Intrauterine route.       No current facility-administered medications for this visit.        Social History     Tobacco Use   • Smoking status: Former Smoker     Packs/day: 0.50     Years: 20.00     Pack years: 10.00     Types: Cigarettes     Quit date:  "2018     Years since quittin.9   • Smokeless tobacco: Never Used   Substance Use Topics   • Alcohol use: Yes     Frequency: Monthly or less     Drinks per session: 1 or 2     Binge frequency: Monthly     Comment: Rare   • Drug use: No     Social History     Social History Narrative   • Not on file       Family History   Problem Relation Age of Onset   • Depression Mother    • Depression Father    • No Known Problems Sister    • No Known Problems Brother    • Heart Disease Maternal Grandmother    • Arterial Aneurysm Maternal Grandfather    • Alzheimer's Disease Paternal Grandmother    • COPD Paternal Grandfather    • Lung Disease Paternal Grandfather    • No Known Problems Sister    • Other Brother         CP   • No Known Problems Daughter    • No Known Problems Son        Allergies, past medical history, past surgical history, family history, social history reviewed and updated.    Review of Systems:     - Constitutional: Negative for fever, chills, unexpected weight change, and fatigue/generalized weakness.     - Respiratory: Negative for cough, sputum production, chest congestion, dyspnea, wheezing, and crackles.      - Cardiovascular: Negative for chest pain, palpitations, orthopnea, and bilateral lower extremity edema.     - Gastrointestinal: RUQ pain      - Psychiatric/Behavioral: Negative for depression, suicidal/homicidal ideation and memory loss.      All other systems reviewed and are negative    Exam:    /82 (BP Location: Left arm, Patient Position: Sitting, BP Cuff Size: Adult)   Pulse 61   Temp 36.9 °C (98.5 °F) (Temporal)   Resp 16   Ht 1.549 m (5' 1\")   Wt 73 kg (161 lb)   SpO2 94%   BMI 30.42 kg/m²  Body mass index is 30.42 kg/m².    Physical Exam:  Constitutional: Well-developed and well-nourished. Not diaphoretic. No distress.   Cardiovascular: Regular rate and rhythm, S1 and S2 without murmur, rubs, or gallops.    Chest: Effort normal. Clear to auscultation throughout. No " adventitious sounds.   Abdomen: tenderness over RUQ in laying position and soft palpation. Soft, without distention. Active bowel sounds in all four quadrants. No rebound, guarding, masses or HSM.  Neurological: Alert and oriented x 3.   Psychiatric:  Behavior, mood, and affect are appropriate.  MA/nursing note and vitals reviewed.    Patient was seen for 25 minutes face to face of which > 50% of appointment time was spent on counseling and coordination of care regarding the above.     Assessment/Plan:  1. RUQ pain  Uncontrolled, stable.  Suspecting cholelithiasis.  Will obtain imaging for definitive diagnosis.  Recommend avoiding trigger foods such as fats and sugars.  Continue OTC NSAIDs, also recommend heat packs, adequate daily hydration with warm water, okay to add lemon to water.  If symptoms get worse go to ED.  Will obtain imaging and discuss findings with patient.  May need referral to general surgery if diagnosis confirms.  Patient will obtain labs ordered by PCP.  No additional labs needed at this time.  - US-RUQ; Future       Discussed with patient possible alternative diagnoses, pt is to take all medications as prescribed. If symptoms persist FU w/PCP, if symptoms worsen go to emergency room. If experiencing any side effects from prescribed medications reports to the office immediately or go to emergency room.  Reviewed indication, dosage, usage and potential adverse effects of prescribed medications. Reviewed risks and benefits of treatment plan. Patient verbalizes understanding of all instruction and verbally agrees to plan.    Return if symptoms worsen or fail to improve.

## 2020-12-28 ENCOUNTER — TELEMEDICINE (OUTPATIENT)
Dept: MEDICAL GROUP | Facility: PHYSICIAN GROUP | Age: 40
End: 2020-12-28
Payer: COMMERCIAL

## 2020-12-28 VITALS — BODY MASS INDEX: 30.4 KG/M2 | WEIGHT: 161 LBS | HEIGHT: 61 IN

## 2020-12-28 DIAGNOSIS — F41.9 ANXIETY: ICD-10-CM

## 2020-12-28 PROCEDURE — 99213 OFFICE O/P EST LOW 20 MIN: CPT | Mod: 95,CR | Performed by: PHYSICIAN ASSISTANT

## 2020-12-28 RX ORDER — FLUOXETINE HYDROCHLORIDE 20 MG/1
20 CAPSULE ORAL DAILY
Qty: 60 CAP | Refills: 0 | Status: SHIPPED
Start: 2020-12-28 | End: 2021-02-02

## 2020-12-28 RX ORDER — ALPRAZOLAM 0.5 MG/1
TABLET ORAL
Qty: 30 TAB | Refills: 0 | Status: SHIPPED | OUTPATIENT
Start: 2020-12-28 | End: 2021-02-02 | Stop reason: SDUPTHER

## 2020-12-28 NOTE — PROGRESS NOTES
Telemedicine Visit: Established Patient     This encounter was conducted via Zoom.   Verbal consent was obtained. Patient's identity was verified.    CC:  Chief Complaint   Patient presents with   • Medication Refill     xanax       HISTORY OF PRESENT ILLNESS: Patient is a 40 y.o. female established patient presenting for recheck of her anxiety.  She states she has been taking half a tablet of Xanax in the morning and a half a tablet in the evening before bed.  She is also been taking  afternoons off for herself and overall she is feeling like her anxiety is improving.  She is not having frequent chest pain episodes like she was previously.          Patient Active Problem List    Diagnosis Date Noted   • RUQ pain 2020   • Mixed hyperlipidemia 2020   • Bilateral impacted cerumen 2020   • Anterior cervical adenopathy 2019   • Anxiety 2019      Allergies:Aspirin    Current Outpatient Medications   Medication Sig Dispense Refill   • fluticasone (FLONASE) 50 MCG/ACT nasal spray Spray 1-2 Sprays in nose 2 times a day as needed. 1 Bottle 1   • cetirizine (ZYRTEC) 10 MG Tab Take 10 mg by mouth every day.     • diphenhydrAMINE HCl (BENADRYL ALLERGY PO) Take 1 Tab by mouth every bedtime.     • Levonorgestrel (MIRENA, 52 MG, IU) by Intrauterine route.       No current facility-administered medications for this visit.        Social History     Tobacco Use   • Smoking status: Former Smoker     Packs/day: 0.50     Years: 20.00     Pack years: 10.00     Types: Cigarettes     Quit date: 2018     Years since quittin.9   • Smokeless tobacco: Never Used   Substance Use Topics   • Alcohol use: Not Currently     Frequency: Monthly or less     Drinks per session: 1 or 2     Binge frequency: Monthly     Comment: Rare   • Drug use: No     Social History     Social History Narrative   • Not on file       Family History   Problem Relation Age of Onset   • Depression Mother    • Depression Father   "  • No Known Problems Sister    • No Known Problems Brother    • Heart Disease Maternal Grandmother    • Arterial Aneurysm Maternal Grandfather    • Alzheimer's Disease Paternal Grandmother    • COPD Paternal Grandfather    • Lung Disease Paternal Grandfather    • No Known Problems Sister    • Other Brother         CP   • No Known Problems Daughter    • No Known Problems Son         ROS:     - Constitutional:  Negative for fever, chills, unexpected weight change, and fatigue/generalized weakness.    - HEENT:  Negative for headaches, vision changes, hearing changes, ear pain, ear discharge, rhinorrhea, sinus congestion, sore throat, and neck pain.      - Respiratory: Negative for cough, sputum production, chest congestion, dyspnea, wheezing, and crackles.      - Cardiovascular: Negative for chest pain, palpitations, orthopnea, and bilateral lower extremity edema.     - Psychiatric/Behavioral:Positive for anxiety.  Negative for depression, suicidal/homicidal ideation and memory loss.          - NOTE: All other systems reviewed and are negative, except as in HPI.      Exam:    Ht 1.549 m (5' 1\")   Wt 73 kg (161 lb)  Body mass index is 30.42 kg/m².    General:  Well nourished, well developed female in NAD  Head is grossly normal.  Neck: Supple.   Pulmonary: No accessory muscle use  Skin: No apparent lesions  Neuro: Alert and oriented  Psych: normal mood and affect    Please note that this dictation was created using voice recognition software. I have made every reasonable attempt to correct obvious errors, but I expect that there are errors of grammar and possibly content that I did not discover before finalizing the note.        Assessment/Plan:  1. Anxiety  I explained to her that medication like Xanax is best used for just as needed use.  I prefer that she was on a better long term solution like Prozac.  She is agreeable to this and we will check this again in 6 weeks.  - FLUoxetine (PROZAC) 20 MG Cap; Take 1 Cap by " mouth every day for 60 days.  Dispense: 60 Cap; Refill: 0  - ALPRAZolam (XANAX) 0.5 MG Tab; Take 1 tablet by mouth once daily as needed for anxiety or sleep  Dispense: 30 Tab; Refill: 0

## 2020-12-30 ENCOUNTER — HOSPITAL ENCOUNTER (OUTPATIENT)
Dept: RADIOLOGY | Facility: MEDICAL CENTER | Age: 40
End: 2020-12-30
Attending: NURSE PRACTITIONER
Payer: COMMERCIAL

## 2020-12-30 DIAGNOSIS — R10.11 RUQ PAIN: ICD-10-CM

## 2020-12-30 PROCEDURE — 76705 ECHO EXAM OF ABDOMEN: CPT

## 2021-01-18 ENCOUNTER — APPOINTMENT (OUTPATIENT)
Dept: MEDICAL GROUP | Facility: PHYSICIAN GROUP | Age: 41
End: 2021-01-18
Payer: COMMERCIAL

## 2021-01-26 ENCOUNTER — APPOINTMENT (OUTPATIENT)
Dept: MEDICAL GROUP | Facility: PHYSICIAN GROUP | Age: 41
End: 2021-01-26
Payer: COMMERCIAL

## 2021-02-02 ENCOUNTER — TELEMEDICINE (OUTPATIENT)
Dept: MEDICAL GROUP | Facility: PHYSICIAN GROUP | Age: 41
End: 2021-02-02
Payer: COMMERCIAL

## 2021-02-02 DIAGNOSIS — F41.9 ANXIETY: ICD-10-CM

## 2021-02-02 PROCEDURE — 99213 OFFICE O/P EST LOW 20 MIN: CPT | Mod: 95,CR | Performed by: PHYSICIAN ASSISTANT

## 2021-02-02 RX ORDER — FLUOXETINE 10 MG/1
10 CAPSULE ORAL DAILY
Qty: 60 CAP | Refills: 0 | Status: SHIPPED | OUTPATIENT
Start: 2021-02-02 | End: 2021-03-17 | Stop reason: SDUPTHER

## 2021-02-02 RX ORDER — ALPRAZOLAM 0.5 MG/1
TABLET ORAL
Qty: 30 TAB | Refills: 0 | Status: SHIPPED | OUTPATIENT
Start: 2021-02-02 | End: 2021-03-17 | Stop reason: SDUPTHER

## 2021-02-02 ASSESSMENT — PATIENT HEALTH QUESTIONNAIRE - PHQ9: CLINICAL INTERPRETATION OF PHQ2 SCORE: 0

## 2021-02-02 NOTE — PROGRESS NOTES
Telemedicine Visit: Established Patient     This encounter was conducted via Zoom.   Verbal consent was obtained. Patient's identity was verified.    CC:  Chief Complaint   Patient presents with   • Follow-Up     medication       HISTORY OF PRESENT ILLNESS: Patient is a 40 y.o. female established patient presenting for recheck anxiety. She feels like the dose might be too high. Thinks her anxiety might increase because too jittery. She admits that she has taken all of her xanax in the past month because of her increased anxiety levels.     Pt had RUQ US done a few weeks ago which returned normal. Pain has resolved. Starting to eat better and exercise more.       No problem-specific Assessment & Plan notes found for this encounter.      Patient Active Problem List    Diagnosis Date Noted   • RUQ pain 2020   • Mixed hyperlipidemia 2020   • Bilateral impacted cerumen 2020   • Anterior cervical adenopathy 2019   • Anxiety 2019      Allergies:Aspirin    Current Outpatient Medications   Medication Sig Dispense Refill   • FLUoxetine (PROZAC) 20 MG Cap Take 1 Cap by mouth every day for 60 days. 60 Cap 0   • ALPRAZolam (XANAX) 0.5 MG Tab Take 1 tablet by mouth once daily as needed for anxiety or sleep 30 Tab 0   • fluticasone (FLONASE) 50 MCG/ACT nasal spray Spray 1-2 Sprays in nose 2 times a day as needed. 1 Bottle 1   • cetirizine (ZYRTEC) 10 MG Tab Take 10 mg by mouth every day.     • diphenhydrAMINE HCl (BENADRYL ALLERGY PO) Take 1 Tab by mouth every bedtime.     • Levonorgestrel (MIRENA, 52 MG, IU) by Intrauterine route.       No current facility-administered medications for this visit.        Social History     Tobacco Use   • Smoking status: Former Smoker     Packs/day: 0.50     Years: 20.00     Pack years: 10.00     Types: Cigarettes     Quit date: 2018     Years since quittin.0   • Smokeless tobacco: Never Used   Substance Use Topics   • Alcohol use: Not Currently      Frequency: Monthly or less     Drinks per session: 1 or 2     Binge frequency: Monthly     Comment: Rare   • Drug use: No     Social History     Social History Narrative   • Not on file       Family History   Problem Relation Age of Onset   • Depression Mother    • Depression Father    • No Known Problems Sister    • No Known Problems Brother    • Heart Disease Maternal Grandmother    • Arterial Aneurysm Maternal Grandfather    • Alzheimer's Disease Paternal Grandmother    • COPD Paternal Grandfather    • Lung Disease Paternal Grandfather    • No Known Problems Sister    • Other Brother         CP   • No Known Problems Daughter    • No Known Problems Son         ROS:     - Constitutional:  Negative for fever, chills, unexpected weight change, and fatigue/generalized weakness.    - HEENT:  Negative for headaches, vision changes, hearing changes, ear pain, ear discharge, rhinorrhea, sinus congestion, sore throat, and neck pain.      - Respiratory: Negative for cough, sputum production, chest congestion, dyspnea, wheezing, and crackles.      - Cardiovascular: Negative for chest pain, palpitations, orthopnea, and bilateral lower extremity edema.     - Gastrointestinal: Negative for heartburn, nausea, vomiting, abdominal pain, hematochezia, melena, diarrhea, constipation, and greasy/foul-smelling stools.     - Genitourinary: Negative for dysuria, polyuria, hematuria, pyuria, urinary urgency, and urinary incontinence.     - Musculoskeletal: Negative for myalgias, back pain, and joint pain.     - Skin: Negative for rash, itching, cyanotic skin color change.     - Neurological: Negative for dizziness, tingling, tremors, focal sensory deficit, focal weakness and headaches.     - Endo/Heme/Allergies: Does not bruise/bleed easily.     - Psychiatric/Behavioral:positive for anxiety.  Negative for depression, suicidal/homicidal ideation and memory loss.              Exam:    There were no vitals taken for this visit. There is no  height or weight on file to calculate BMI.    General:  Well nourished, well developed female in NAD  Head is grossly normal.  Neck: Supple.   Pulmonary: No accessory muscle use  Skin: No apparent lesions  Neuro: Alert and oriented  Psych: normal mood and affect    Please note that this dictation was created using voice recognition software. I have made every reasonable attempt to correct obvious errors, but I expect that there are errors of grammar and possibly content that I did not discover before finalizing the note.        Assessment/Plan:  1. Anxiety  We will reduce the dose to 10 mg and recheck again in 6 weeks.  PDMP checked  - FLUoxetine (PROZAC) 10 MG Cap; Take 1 Cap by mouth every day for 60 days.  Dispense: 60 Cap; Refill: 0  - ALPRAZolam (XANAX) 0.5 MG Tab; Take 1 tablet by mouth once daily as needed for anxiety or sleep  Dispense: 30 Tab; Refill: 0

## 2021-03-17 ENCOUNTER — TELEMEDICINE (OUTPATIENT)
Dept: MEDICAL GROUP | Facility: PHYSICIAN GROUP | Age: 41
End: 2021-03-17
Payer: COMMERCIAL

## 2021-03-17 DIAGNOSIS — R06.02 SOB (SHORTNESS OF BREATH): ICD-10-CM

## 2021-03-17 DIAGNOSIS — Z87.09 HISTORY OF URI (UPPER RESPIRATORY INFECTION): ICD-10-CM

## 2021-03-17 DIAGNOSIS — F41.9 ANXIETY: ICD-10-CM

## 2021-03-17 PROCEDURE — 99213 OFFICE O/P EST LOW 20 MIN: CPT | Mod: 95,CR | Performed by: PHYSICIAN ASSISTANT

## 2021-03-17 RX ORDER — FLUOXETINE 10 MG/1
10 CAPSULE ORAL DAILY
Qty: 90 CAPSULE | Refills: 1 | Status: SHIPPED | OUTPATIENT
Start: 2021-03-17 | End: 2021-06-29 | Stop reason: SDUPTHER

## 2021-03-17 RX ORDER — ALPRAZOLAM 0.5 MG/1
TABLET ORAL
Qty: 30 TABLET | Refills: 0 | Status: SHIPPED | OUTPATIENT
Start: 2021-03-17 | End: 2021-04-30

## 2021-03-17 RX ORDER — ALBUTEROL SULFATE 90 UG/1
2 AEROSOL, METERED RESPIRATORY (INHALATION) EVERY 4 HOURS PRN
Qty: 1 EACH | Refills: 0 | Status: SHIPPED | OUTPATIENT
Start: 2021-03-17 | End: 2021-06-15

## 2021-03-17 NOTE — PROGRESS NOTES
Telemedicine Visit: Established Patient     This encounter was conducted via Zoom.   Verbal consent was obtained. Patient's identity was verified.    CC:  Chief Complaint   Patient presents with   • Follow-Up     anti-depressant   • Medication Refill       HISTORY OF PRESENT ILLNESS: Patient is a 40 y.o. female established patient presenting for recheck.   Pt states her prozac dose at 10mg is excellent. She is not longer feeling super anxious and is less jittery than previously. She notes that she has had to reduce her caffeine consumption. She has been making more time for herself and is now going to the gym 5 days per week so she can have some personal time. She continues to take a half tab of xanax every couple of days for moments of stress.     Pt would like refill on inhaler for when she is doing exercise. Getting SOB.     Patient Active Problem List    Diagnosis Date Noted   • RUQ pain 2020   • Mixed hyperlipidemia 2020   • Bilateral impacted cerumen 2020   • Anterior cervical adenopathy 2019   • Anxiety 2019      Allergies:Aspirin    Current Outpatient Medications   Medication Sig Dispense Refill   • FLUoxetine (PROZAC) 10 MG Cap Take 1 Cap by mouth every day for 60 days. 60 Cap 0   • ALPRAZolam (XANAX) 0.5 MG Tab Take 1 tablet by mouth once daily as needed for anxiety or sleep 30 Tab 0   • fluticasone (FLONASE) 50 MCG/ACT nasal spray Spray 1-2 Sprays in nose 2 times a day as needed. 1 Bottle 1   • cetirizine (ZYRTEC) 10 MG Tab Take 10 mg by mouth every day.     • diphenhydrAMINE HCl (BENADRYL ALLERGY PO) Take 1 Tab by mouth every bedtime.     • Levonorgestrel (MIRENA, 52 MG, IU) by Intrauterine route.       No current facility-administered medications for this visit.       Social History     Tobacco Use   • Smoking status: Former Smoker     Packs/day: 0.50     Years: 20.00     Pack years: 10.00     Types: Cigarettes     Quit date: 2018     Years since quittin.2   •  Smokeless tobacco: Never Used   Substance Use Topics   • Alcohol use: Not Currently     Comment: Rare   • Drug use: No     Social History     Social History Narrative   • Not on file       Family History   Problem Relation Age of Onset   • Depression Mother    • Depression Father    • No Known Problems Sister    • No Known Problems Brother    • Heart Disease Maternal Grandmother    • Arterial Aneurysm Maternal Grandfather    • Alzheimer's Disease Paternal Grandmother    • COPD Paternal Grandfather    • Lung Disease Paternal Grandfather    • No Known Problems Sister    • Other Brother         CP   • No Known Problems Daughter    • No Known Problems Son         ROS:     - Constitutional:  Negative for fever, chills, unexpected weight change, and fatigue/generalized weakness.     - Respiratory: Positive for SOB with exertion. Negative for cough, sputum production, chest congestion, dyspnea, wheezing, and crackles.      - Cardiovascular: Negative for chest pain, palpitations, orthopnea, and bilateral lower extremity edema.      - Neurological: Negative for dizziness, tingling, tremors, focal sensory deficit, focal weakness and headaches.     - Endo/Heme/Allergies: Does not bruise/bleed easily.     - Psychiatric/Behavioral: Positive for anxiety.  Negative for depression, suicidal/homicidal ideation and memory loss.              Exam:    There were no vitals taken for this visit. There is no height or weight on file to calculate BMI.    General:  Well nourished, well developed female in NAD  Head is grossly normal.  Neck: Supple.   Pulmonary: No accessory muscle use  Skin: No apparent lesions  Neuro: Alert and oriented  Psych: normal mood and affect    Please note that this dictation was created using voice recognition software. I have made every reasonable attempt to correct obvious errors, but I expect that there are errors of grammar and possibly content that I did not discover before finalizing the  note.        Assessment/Plan:  1. Anxiety  Her anxiety is much improved. Her dose of prozac is good and she is continuing to make good lifestyle changes. PDMP checked. Refill of xanax sent in.   - FLUoxetine (PROZAC) 10 MG Cap; Take 1 capsule by mouth every day for 180 days.  Dispense: 90 capsule; Refill: 1  - ALPRAZolam (XANAX) 0.5 MG Tab; Take one half tablet by mouth once daily as needed for anxiety or sleep for 180 days.  Dispense: 30 tablet; Refill: 0    2. SOB (shortness of breath)  Refill sent in  - albuterol 108 (90 Base) MCG/ACT Aero Soln inhalation aerosol; Inhale 2 Puffs every four hours as needed for Shortness of Breath for up to 90 days.  Dispense: 1 Each; Refill: 0

## 2021-04-30 ENCOUNTER — OFFICE VISIT (OUTPATIENT)
Dept: URGENT CARE | Facility: CLINIC | Age: 41
End: 2021-04-30
Payer: COMMERCIAL

## 2021-04-30 ENCOUNTER — APPOINTMENT (OUTPATIENT)
Dept: RADIOLOGY | Facility: IMAGING CENTER | Age: 41
End: 2021-04-30
Attending: NURSE PRACTITIONER
Payer: COMMERCIAL

## 2021-04-30 VITALS
RESPIRATION RATE: 16 BRPM | HEIGHT: 62 IN | BODY MASS INDEX: 27.46 KG/M2 | DIASTOLIC BLOOD PRESSURE: 82 MMHG | OXYGEN SATURATION: 96 % | SYSTOLIC BLOOD PRESSURE: 126 MMHG | TEMPERATURE: 97.6 F | HEART RATE: 62 BPM | WEIGHT: 149.2 LBS

## 2021-04-30 DIAGNOSIS — M25.572 ACUTE LEFT ANKLE PAIN: ICD-10-CM

## 2021-04-30 DIAGNOSIS — S99.922A INJURY OF LEFT FOOT, INITIAL ENCOUNTER: ICD-10-CM

## 2021-04-30 PROCEDURE — 73610 X-RAY EXAM OF ANKLE: CPT | Mod: TC,LT | Performed by: NURSE PRACTITIONER

## 2021-04-30 PROCEDURE — 73630 X-RAY EXAM OF FOOT: CPT | Mod: TC,LT | Performed by: NURSE PRACTITIONER

## 2021-04-30 PROCEDURE — 99213 OFFICE O/P EST LOW 20 MIN: CPT | Performed by: NURSE PRACTITIONER

## 2021-04-30 ASSESSMENT — ENCOUNTER SYMPTOMS
TINGLING: 0
SENSORY CHANGE: 0
CHILLS: 0
FEVER: 0
MYALGIAS: 1
FOCAL WEAKNESS: 0

## 2021-04-30 NOTE — PROGRESS NOTES
Subjective:      Trisha Noguera is a 40 y.o. female who presents with Foot Pain ((L) X 2 weeks stepped wrong, swelling, throbbing)            HPI   New problem.  40-year-old female who presents with left foot pain x2 weeks after stepping wrong off of her porch.  She has associated swelling and throbbing sensation along the lateral aspect of her left foot.  She denies any numbness or tingling of her toes.  She denies any pain.  She has been using ice, ibuprofen, and a compression sock for her symptoms but states that they have not really improved over the past 2 weeks.  Aspirin  Current Outpatient Medications on File Prior to Visit   Medication Sig Dispense Refill   • FLUoxetine (PROZAC) 10 MG Cap Take 1 capsule by mouth every day for 180 days. 90 capsule 1   • ALPRAZolam (XANAX) 0.5 MG Tab Take one half tablet by mouth once daily as needed for anxiety or sleep for 180 days. 30 tablet 0   • albuterol 108 (90 Base) MCG/ACT Aero Soln inhalation aerosol Inhale 2 Puffs every four hours as needed for Shortness of Breath for up to 90 days. 1 Each 0   • fluticasone (FLONASE) 50 MCG/ACT nasal spray Spray 1-2 Sprays in nose 2 times a day as needed. 1 Bottle 1   • cetirizine (ZYRTEC) 10 MG Tab Take 10 mg by mouth every day.     • diphenhydrAMINE HCl (BENADRYL ALLERGY PO) Take 1 Tab by mouth every bedtime.     • Levonorgestrel (MIRENA, 52 MG, IU) by Intrauterine route.       No current facility-administered medications on file prior to visit.     Social History     Socioeconomic History   • Marital status:      Spouse name: Not on file   • Number of children: Not on file   • Years of education: Not on file   • Highest education level: Not on file   Occupational History     Comment:    Tobacco Use   • Smoking status: Former Smoker     Packs/day: 0.50     Years: 20.00     Pack years: 10.00     Types: Cigarettes     Quit date: 2018     Years since quittin.3   • Smokeless tobacco: Never Used  "  Substance and Sexual Activity   • Alcohol use: Not Currently     Comment: Rare   • Drug use: No   • Sexual activity: Yes     Birth control/protection: I.U.D.     Comment: ; 10 years. Mirena IUD placed 2019   Other Topics Concern   • Not on file   Social History Narrative   • Not on file     Social Determinants of Health     Financial Resource Strain:    • Difficulty of Paying Living Expenses:    Food Insecurity:    • Worried About Running Out of Food in the Last Year:    • Ran Out of Food in the Last Year:    Transportation Needs:    • Lack of Transportation (Medical):    • Lack of Transportation (Non-Medical):    Physical Activity:    • Days of Exercise per Week:    • Minutes of Exercise per Session:    Stress:    • Feeling of Stress :    Social Connections:    • Frequency of Communication with Friends and Family:    • Frequency of Social Gatherings with Friends and Family:    • Attends [employer]ast[employer] Services:    • Active Member of Clubs or Organizations:    • Attends Club or Organization Meetings:    • Marital Status:    Intimate Partner Violence:    • Fear of Current or Ex-Partner:    • Emotionally Abused:    • Physically Abused:    • Sexually Abused:      Breast Cancer-related family history is not on file.      Review of Systems   Constitutional: Negative for chills and fever.   Musculoskeletal: Positive for joint pain and myalgias.   Neurological: Negative for tingling, sensory change and focal weakness.          Objective:     /82 (BP Location: Right arm, Patient Position: Sitting, BP Cuff Size: Adult)   Pulse 62   Temp 36.4 °C (97.6 °F) (Temporal)   Resp 16   Ht 1.562 m (5' 1.5\")   Wt 67.7 kg (149 lb 3.2 oz)   SpO2 96%   BMI 27.73 kg/m²      Physical Exam  Vitals and nursing note reviewed.   Constitutional:       Appearance: Normal appearance. She is not ill-appearing.   Cardiovascular:      Rate and Rhythm: Normal rate and regular rhythm.      Heart sounds: No murmur.   Pulmonary:      " Effort: Pulmonary effort is normal.      Breath sounds: Normal breath sounds.   Musculoskeletal:      Left ankle: No swelling. Tenderness present over the lateral malleolus. Normal range of motion.      Left Achilles Tendon: Normal.      Left foot: Decreased range of motion. Swelling, tenderness and bony tenderness present.   Skin:     General: Skin is warm and dry.      Findings: No bruising.   Neurological:      General: No focal deficit present.      Mental Status: She is alert and oriented to person, place, and time.   Psychiatric:         Mood and Affect: Mood normal.                 Assessment/Plan:        1. Injury of left foot, initial encounter  DX-FOOT-COMPLETE 3+ LEFT   2. Acute left ankle pain  DX-ANKLE 3+ VIEWS LEFT     X-rays are normal for her ankle and her foot.  Because she is very active she has 2 children and is working we have agreed to place her in a short walking boot for comfort.  She is advised to continue ibuprofen and icing and elevation whenever she can.  Follow-up as needed.

## 2021-06-29 ENCOUNTER — OFFICE VISIT (OUTPATIENT)
Dept: MEDICAL GROUP | Facility: PHYSICIAN GROUP | Age: 41
End: 2021-06-29
Payer: COMMERCIAL

## 2021-06-29 VITALS
BODY MASS INDEX: 26.91 KG/M2 | WEIGHT: 146.2 LBS | HEART RATE: 73 BPM | RESPIRATION RATE: 14 BRPM | DIASTOLIC BLOOD PRESSURE: 72 MMHG | HEIGHT: 62 IN | TEMPERATURE: 98.8 F | SYSTOLIC BLOOD PRESSURE: 112 MMHG | OXYGEN SATURATION: 97 %

## 2021-06-29 DIAGNOSIS — R06.02 SOB (SHORTNESS OF BREATH) ON EXERTION: ICD-10-CM

## 2021-06-29 DIAGNOSIS — Z12.31 ENCOUNTER FOR SCREENING MAMMOGRAM FOR BREAST CANCER: ICD-10-CM

## 2021-06-29 DIAGNOSIS — F41.9 ANXIETY: ICD-10-CM

## 2021-06-29 PROCEDURE — 99213 OFFICE O/P EST LOW 20 MIN: CPT | Performed by: PHYSICIAN ASSISTANT

## 2021-06-29 RX ORDER — FLUOXETINE 10 MG/1
10 CAPSULE ORAL DAILY
Qty: 90 CAPSULE | Refills: 1 | Status: SHIPPED | OUTPATIENT
Start: 2021-06-29 | End: 2021-12-22 | Stop reason: SDUPTHER

## 2021-06-29 RX ORDER — ALBUTEROL SULFATE 90 UG/1
2 AEROSOL, METERED RESPIRATORY (INHALATION) EVERY 4 HOURS PRN
Qty: 1 EACH | Refills: 0 | Status: SHIPPED | OUTPATIENT
Start: 2021-06-29 | End: 2021-08-30 | Stop reason: SDUPTHER

## 2021-06-29 RX ORDER — HYDROXYZINE 50 MG/1
50 TABLET, FILM COATED ORAL EVERY 6 HOURS PRN
Qty: 60 TABLET | Refills: 0 | Status: SHIPPED | OUTPATIENT
Start: 2021-06-29 | End: 2021-07-29

## 2021-06-29 ASSESSMENT — PATIENT HEALTH QUESTIONNAIRE - PHQ9: CLINICAL INTERPRETATION OF PHQ2 SCORE: 0

## 2021-06-29 NOTE — PROGRESS NOTES
CC:  Chief Complaint   Patient presents with   • Medication Refill     inhaler, prozac, ALPRAZolam (XANAX)   • Referral Needed     mk       HISTORY OF PRESENT ILLNESS: Patient is a 40 y.o. female established patient presenting for medication recheck  1. Prozac working well for anxiety. Requesting refill of xanax again. Taking xanax half tab when getting home from work.   2. Pt needing albuterol refill for when doing exercise in the heat.      No problem-specific Assessment & Plan notes found for this encounter.      Patient Active Problem List    Diagnosis Date Noted   • RUQ pain 2020   • Mixed hyperlipidemia 2020   • Bilateral impacted cerumen 2020   • Anterior cervical adenopathy 2019   • Anxiety 2019      Allergies:Aspirin    Current Outpatient Medications   Medication Sig Dispense Refill   • FLUoxetine (PROZAC) 10 MG Cap Take 1 capsule by mouth every day for 180 days. 90 capsule 1   • fluticasone (FLONASE) 50 MCG/ACT nasal spray Spray 1-2 Sprays in nose 2 times a day as needed. 1 Bottle 1   • cetirizine (ZYRTEC) 10 MG Tab Take 10 mg by mouth every day.     • diphenhydrAMINE HCl (BENADRYL ALLERGY PO) Take 1 Tab by mouth every bedtime.     • Levonorgestrel (MIRENA, 52 MG, IU) by Intrauterine route.       No current facility-administered medications for this visit.       Social History     Tobacco Use   • Smoking status: Former Smoker     Packs/day: 0.50     Years: 20.00     Pack years: 10.00     Types: Cigarettes     Quit date: 2018     Years since quittin.4   • Smokeless tobacco: Never Used   Vaping Use   • Vaping Use: Never used   Substance Use Topics   • Alcohol use: Not Currently     Comment: Rare   • Drug use: No     Social History     Social History Narrative   • Not on file       Family History   Problem Relation Age of Onset   • Depression Mother    • Depression Father    • No Known Problems Sister    • No Known Problems Brother    • Heart Disease Maternal  "Grandmother    • Arterial Aneurysm Maternal Grandfather    • Alzheimer's Disease Paternal Grandmother    • COPD Paternal Grandfather    • Lung Disease Paternal Grandfather    • No Known Problems Sister    • Other Brother         CP   • No Known Problems Daughter    • No Known Problems Son         ROS:     - Constitutional:  Negative for fever, chills, unexpected weight change, and fatigue/generalized weakness.    - HEENT:  Negative for headaches, vision changes, hearing changes, ear pain, ear discharge, rhinorrhea, sinus congestion, sore throat, and neck pain.      - Respiratory: Positive shortness of breath with exertion negative for cough, sputum production, chest congestion, dyspnea, wheezing, and crackles.      - Cardiovascular: Negative for chest pain, palpitations, orthopnea, and bilateral lower extremity edema.      - Psychiatric/Behavioral:Positive for anxiety.  Negative for depression, suicidal/homicidal ideation and memory loss.              Exam:    /72   Pulse 73   Temp 37.1 °C (98.8 °F) (Temporal)   Resp 14   Ht 1.562 m (5' 1.5\")   Wt 66.3 kg (146 lb 3.2 oz)   SpO2 97%  Body mass index is 27.18 kg/m².    General:  Well nourished, well developed female in NAD  Head is grossly normal.  Neck: Supple. Thyroid is not enlarged.  Skin: Warm and dry. No obvious lesions  Neuro: Normal muscle tone. Gait normal. Alert and oriented.  Psych: Normal mood and affect      Please note that this dictation was created using voice recognition software. I have made every reasonable attempt to correct obvious errors, but I expect that there are errors of grammar and possibly content that I did not discover before finalizing the note.        Assessment/Plan:  1. Anxiety  Refill Prozac sent in.  I explained to her my concern with taking Xanax on such a regular basis that it would become habit forming and would eventually become a crutch.  I am going to trial her on hydroxyzine for acute anxiety.  Do not take " concurrently with other antihistamines.  - FLUoxetine (PROZAC) 10 MG Cap; Take 1 capsule by mouth every day for 180 days.  Dispense: 90 capsule; Refill: 1  - hydrOXYzine HCl (ATARAX) 50 MG Tab; Take 1 tablet by mouth every 6 hours as needed for Anxiety for up to 30 days.  Dispense: 60 tablet; Refill: 0    2. Encounter for screening mammogram for breast cancer  Order placed  - MA-SCREENING MAMMO BILAT W/TOMOSYNTHESIS W/CAD; Future    3. SOB (shortness of breath) on exertion  Refill sent in  - albuterol 108 (90 Base) MCG/ACT Aero Soln inhalation aerosol; Inhale 2 Puffs every four hours as needed for Shortness of Breath for up to 30 days.  Dispense: 1 Each; Refill: 0.

## 2021-08-30 DIAGNOSIS — R06.02 SOB (SHORTNESS OF BREATH) ON EXERTION: ICD-10-CM

## 2021-08-31 RX ORDER — ALBUTEROL SULFATE 90 UG/1
2 AEROSOL, METERED RESPIRATORY (INHALATION) EVERY 4 HOURS PRN
Qty: 18 G | Refills: 3 | Status: SHIPPED | OUTPATIENT
Start: 2021-08-31 | End: 2021-09-30

## 2021-12-22 ENCOUNTER — PATIENT MESSAGE (OUTPATIENT)
Dept: MEDICAL GROUP | Facility: PHYSICIAN GROUP | Age: 41
End: 2021-12-22

## 2021-12-22 DIAGNOSIS — F41.9 ANXIETY: ICD-10-CM

## 2021-12-22 RX ORDER — FLUOXETINE 10 MG/1
10 CAPSULE ORAL DAILY
Qty: 90 CAPSULE | Refills: 1 | Status: SHIPPED | OUTPATIENT
Start: 2021-12-22 | End: 2022-06-23 | Stop reason: SDUPTHER

## 2021-12-22 NOTE — TELEPHONE ENCOUNTER
From: Trisha Noguera  To: Physician Assistant Radames Rosales  Sent: 12/22/2021 10:26 AM PST  Subject: Refill     Radames Matthews.   Would it be possible for me to get a refill on my Prozac? I’m out next Wednesday. Thanks so much!!!

## 2021-12-22 NOTE — PATIENT COMMUNICATION
Received request via: Patient    Was the patient seen in the last year in this department? Yes    Does the patient have an active prescription (recently filled or refills available) for medication(s) requested? No       Next appt 12/29/2021

## 2021-12-28 ENCOUNTER — TELEMEDICINE (OUTPATIENT)
Dept: MEDICAL GROUP | Facility: PHYSICIAN GROUP | Age: 41
End: 2021-12-28
Payer: COMMERCIAL

## 2021-12-28 VITALS — WEIGHT: 150 LBS | BODY MASS INDEX: 28.32 KG/M2 | HEIGHT: 61 IN

## 2021-12-28 DIAGNOSIS — Z00.00 PHYSICAL EXAM, ANNUAL: ICD-10-CM

## 2021-12-28 DIAGNOSIS — F41.9 ANXIETY: ICD-10-CM

## 2021-12-28 PROCEDURE — 99213 OFFICE O/P EST LOW 20 MIN: CPT | Mod: 95 | Performed by: PHYSICIAN ASSISTANT

## 2021-12-28 RX ORDER — ALBUTEROL SULFATE 90 UG/1
AEROSOL, METERED RESPIRATORY (INHALATION)
COMMUNITY
Start: 2021-10-24 | End: 2022-06-23 | Stop reason: SDUPTHER

## 2021-12-28 NOTE — PROGRESS NOTES
Telemedicine Visit: Established Patient     This encounter was conducted via Zoom.   Verbal consent was obtained. Patient's identity was verified.    CC:  Chief Complaint   Patient presents with   • Medication Refill     FLUoxetine       HISTORY OF PRESENT ILLNESS: Patient is a 41 y.o. female established patient presenting for medication recheck. Needing her prozac refilled. 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      No problem-specific Assessment & Plan notes found for this encounter.      Patient Active Problem List    Diagnosis Date Noted   • RUQ pain 2020   • Mixed hyperlipidemia 2020   • Bilateral impacted cerumen 2020   • Anterior cervical adenopathy 2019   • Anxiety 2019      Allergies:Aspirin    Current Outpatient Medications   Medication Sig Dispense Refill   • albuterol 108 (90 Base) MCG/ACT Aero Soln inhalation aerosol      • FLUoxetine (PROZAC) 10 MG Cap Take 1 Capsule by mouth every day for 180 days. 90 Capsule 1   • fluticasone (FLONASE) 50 MCG/ACT nasal spray Spray 1-2 Sprays in nose 2 times a day as needed. 1 Bottle 1   • cetirizine (ZYRTEC) 10 MG Tab Take 10 mg by mouth every day.     • diphenhydrAMINE HCl (BENADRYL ALLERGY PO) Take 1 Tab by mouth every bedtime.     • Levonorgestrel (MIRENA, 52 MG, IU) by Intrauterine route.       No current facility-administered medications for this visit.       Social History     Tobacco Use   • Smoking status: Former Smoker     Packs/day: 0.50     Years: 20.00     Pack years: 10.00     Types: Cigarettes     Quit date: 2018     Years since quittin.9   • Smokeless tobacco: Never Used   Vaping Use   • Vaping Use: Never used   Substance Use Topics   • Alcohol use: Not Currently     Comment: Rare   • Drug use: No     Social History     Social History Narrative   • Not on file       Family History   Problem Relation Age of Onset   •  "Depression Mother    • Depression Father    • No Known Problems Sister    • No Known Problems Brother    • Heart Disease Maternal Grandmother    • Arterial Aneurysm Maternal Grandfather    • Alzheimer's Disease Paternal Grandmother    • COPD Paternal Grandfather    • Lung Disease Paternal Grandfather    • No Known Problems Sister    • Other Brother         CP   • No Known Problems Daughter    • No Known Problems Son         ROS:     - Constitutional:  Negative for fever, chills, unexpected weight change, and fatigue/generalized weakness.     - Psychiatric/Behavioral:Positive for anxiety. Negative for depression, suicidal/homicidal ideation and memory loss.              Exam:    Ht 1.549 m (5' 1\")   Wt 68 kg (150 lb)  Body mass index is 28.34 kg/m².    General:  Well nourished, well developed female in NAD  Head is grossly normal.  Neck: Supple.   Pulmonary: No accessory muscle use  Skin: No apparent lesions  Neuro: Alert and oriented  Psych: normal mood and affect    Please note that this dictation was created using voice recognition software. I have made every reasonable attempt to correct obvious errors, but I expect that there are errors of grammar and possibly content that I did not discover before finalizing the note.        Assessment/Plan:  1. Physical exam, annual  Labs printed. Will review at next appt.  - CBC WITH DIFFERENTIAL; Future  - Comp Metabolic Panel; Future  - HEMOGLOBIN A1C; Future  - Lipid Profile; Future  - TSH WITH REFLEX TO FT4; Future    2. Anxiety  Refill sent in last week. Continue with current dose of prozac.     "

## 2022-02-02 ENCOUNTER — TELEMEDICINE (OUTPATIENT)
Dept: MEDICAL GROUP | Facility: PHYSICIAN GROUP | Age: 42
End: 2022-02-02
Payer: COMMERCIAL

## 2022-02-02 VITALS — WEIGHT: 151 LBS | HEIGHT: 61 IN | BODY MASS INDEX: 28.51 KG/M2

## 2022-02-02 DIAGNOSIS — U07.1 COVID-19: ICD-10-CM

## 2022-02-02 PROCEDURE — 99213 OFFICE O/P EST LOW 20 MIN: CPT | Mod: 95 | Performed by: PHYSICIAN ASSISTANT

## 2022-02-02 RX ORDER — ALBUTEROL SULFATE 2.5 MG/3ML
2.5 SOLUTION RESPIRATORY (INHALATION) EVERY 6 HOURS PRN
Qty: 12 EACH | Refills: 0 | Status: SHIPPED | OUTPATIENT
Start: 2022-02-02 | End: 2022-02-12

## 2022-02-02 ASSESSMENT — PATIENT HEALTH QUESTIONNAIRE - PHQ9
CLINICAL INTERPRETATION OF PHQ2 SCORE: 3
SUM OF ALL RESPONSES TO PHQ QUESTIONS 1-9: 6
5. POOR APPETITE OR OVEREATING: 1 - SEVERAL DAYS

## 2022-02-02 NOTE — PROGRESS NOTES
Telemedicine Visit: Established Patient     This encounter was conducted via Zoom.   Verbal consent was obtained. Patient's identity was verified.    CC:  Chief Complaint   Patient presents with   • Coronavirus Screening     x1wk        HISTORY OF PRESENT ILLNESS: Patient is a 41 y.o. female established patient presenting because she got COVID. Tested positive 8 days ago. Went back to work 3 days ago and still having a lot chest congestion. Has been some mild SOB but nothing severe. Using her inhaler. Fevers resolved, but getting sweats and chills. Has been taking nasal spray, mucinex and ibuprofen/tylenol.       No problem-specific Assessment & Plan notes found for this encounter.      Patient Active Problem List    Diagnosis Date Noted   • RUQ pain 12/22/2020   • Mixed hyperlipidemia 01/08/2020   • Bilateral impacted cerumen 01/08/2020   • Anterior cervical adenopathy 12/30/2019   • Anxiety 12/30/2019      Allergies:Aspirin    Current Outpatient Medications   Medication Sig Dispense Refill   • albuterol 108 (90 Base) MCG/ACT Aero Soln inhalation aerosol      • FLUoxetine (PROZAC) 10 MG Cap Take 1 Capsule by mouth every day for 180 days. 90 Capsule 1   • fluticasone (FLONASE) 50 MCG/ACT nasal spray Spray 1-2 Sprays in nose 2 times a day as needed. 1 Bottle 1   • cetirizine (ZYRTEC) 10 MG Tab Take 10 mg by mouth every day.     • diphenhydrAMINE HCl (BENADRYL ALLERGY PO) Take 1 Tab by mouth every bedtime.     • Levonorgestrel (MIRENA, 52 MG, IU) by Intrauterine route.       No current facility-administered medications for this visit.       Social History     Tobacco Use   • Smoking status: Former Smoker     Packs/day: 0.50     Years: 20.00     Pack years: 10.00     Types: Cigarettes     Quit date: 12/30/2018     Years since quitting: 3.0   • Smokeless tobacco: Never Used   Vaping Use   • Vaping Use: Never used   Substance Use Topics   • Alcohol use: Not Currently     Comment: Rare   • Drug use: No     Social History  "    Social History Narrative   • Not on file       Family History   Problem Relation Age of Onset   • Depression Mother    • Depression Father    • No Known Problems Sister    • No Known Problems Brother    • Heart Disease Maternal Grandmother    • Arterial Aneurysm Maternal Grandfather    • Alzheimer's Disease Paternal Grandmother    • COPD Paternal Grandfather    • Lung Disease Paternal Grandfather    • No Known Problems Sister    • Other Brother         CP   • No Known Problems Daughter    • No Known Problems Son         ROS:     - Constitutional: Positive for chills, fatigue.  Negative for fever,  unexpected weight change,     - HEENT: Positive for nasal congestion.  Negative for headaches, vision changes, hearing changes, ear pain, ear discharge, sore throat, and neck pain.      - Respiratory:Positive for cough, SOB.  Negative for   wheezing, and crackles.      - Cardiovascular: Negative for chest pain, palpitations, orthopnea, and bilateral lower extremity edema.        Exam:    Ht 1.549 m (5' 1\")   Wt 68.5 kg (151 lb)  Body mass index is 28.53 kg/m².    General:  Well nourished, well developed female in NAD  Head is grossly normal.  Neck: Supple.   Pulmonary: No accessory muscle use  Skin: No apparent lesions  Neuro: Alert and oriented  Psych: normal mood and affect    Please note that this dictation was created using voice recognition software. I have made every reasonable attempt to correct obvious errors, but I expect that there are errors of grammar and possibly content that I did not discover before finalizing the note.        Assessment/Plan:  1. COVID-19  Advised that she is within normal time range of recovery as it varies so much from person to person. Continue with current treatment. If her condition is worsening, she should be evaluated in person. Take albuterol PRN.   - albuterol (PROVENTIL) 2.5mg/3ml Nebu Soln solution for nebulization; Take 3 mL by nebulization every 6 hours as needed for " Shortness of Breath for up to 10 days.  Dispense: 12 Each; Refill: 0

## 2022-06-23 ENCOUNTER — OFFICE VISIT (OUTPATIENT)
Dept: MEDICAL GROUP | Facility: PHYSICIAN GROUP | Age: 42
End: 2022-06-23
Payer: COMMERCIAL

## 2022-06-23 VITALS
HEART RATE: 69 BPM | RESPIRATION RATE: 16 BRPM | TEMPERATURE: 97.7 F | OXYGEN SATURATION: 97 % | DIASTOLIC BLOOD PRESSURE: 78 MMHG | BODY MASS INDEX: 30.22 KG/M2 | HEIGHT: 61 IN | WEIGHT: 160.05 LBS | SYSTOLIC BLOOD PRESSURE: 120 MMHG

## 2022-06-23 DIAGNOSIS — F41.9 ANXIETY: ICD-10-CM

## 2022-06-23 DIAGNOSIS — J45.30 MILD PERSISTENT ASTHMA WITHOUT COMPLICATION: ICD-10-CM

## 2022-06-23 DIAGNOSIS — D58.2 ELEVATED HEMOGLOBIN (HCC): ICD-10-CM

## 2022-06-23 DIAGNOSIS — E78.2 MIXED HYPERLIPIDEMIA: ICD-10-CM

## 2022-06-23 PROBLEM — J45.909 ASTHMA: Status: ACTIVE | Noted: 2022-06-23

## 2022-06-23 PROCEDURE — 99214 OFFICE O/P EST MOD 30 MIN: CPT | Performed by: STUDENT IN AN ORGANIZED HEALTH CARE EDUCATION/TRAINING PROGRAM

## 2022-06-23 RX ORDER — FLUOXETINE 10 MG/1
10 CAPSULE ORAL DAILY
Qty: 90 CAPSULE | Refills: 1 | Status: SHIPPED | OUTPATIENT
Start: 2022-06-23 | End: 2022-12-20

## 2022-06-23 RX ORDER — ALBUTEROL SULFATE 90 UG/1
1 AEROSOL, METERED RESPIRATORY (INHALATION) EVERY 6 HOURS PRN
Qty: 18 G | Refills: 3 | Status: SHIPPED | OUTPATIENT
Start: 2022-06-23 | End: 2023-01-12 | Stop reason: SDUPTHER

## 2022-06-23 RX ORDER — FLUTICASONE FUROATE 100 UG/1
1 POWDER RESPIRATORY (INHALATION) DAILY
Qty: 30 EACH | Refills: 2 | Status: SHIPPED | OUTPATIENT
Start: 2022-06-23 | End: 2022-08-22

## 2022-06-23 ASSESSMENT — ENCOUNTER SYMPTOMS
SHORTNESS OF BREATH: 1
BLURRED VISION: 0
WEIGHT LOSS: 0
WHEEZING: 1
HEADACHES: 0
CHILLS: 0
DOUBLE VISION: 0
FEVER: 0

## 2022-06-23 NOTE — ASSESSMENT & PLAN NOTE
Patient having mild persistent asthma after having COVID.  She is using her albuterol inhaler multiple times a day and about 3 times a week at night.    Plan:   Rx daily ICS inhaler, continue albuterol rescue inhaler as needed.

## 2022-06-23 NOTE — ASSESSMENT & PLAN NOTE
Relative polycythemia secondary to dehydration as she was fasting for these labs most likely.  No concerning symptoms at this time except for some vague symptoms of hot flashes.    Plan:  Patient to return to care in 2 months, repeat CBC and further testing if necessary.  Consider perimenopausal lab work-up.

## 2022-06-23 NOTE — PROGRESS NOTES
HISTORY OF PRESENT ILLNESS: Trisha is a pleasant 41 y.o. female, established patient who presents today to discuss medical problems as listed below:    Problem   Asthma    Has been having to use rescue inhaler more since getting covid, around feb of 2022.      Elevated Hemoglobin (Hcc)    Elevated hemoglobin 16.2  Hematocrit 48.6    Patient denies any dizziness, vision changes, pruritus, headaches.  She does report to some menopause like hot flashes that has started within the last few weeks.       Mixed Hyperlipidemia        Current Outpatient Medications Ordered in Epic   Medication Sig Dispense Refill   • FLUoxetine (PROZAC) 10 MG Cap Take 1 Capsule by mouth every day for 180 days. 90 Capsule 1   • albuterol 108 (90 Base) MCG/ACT Aero Soln inhalation aerosol Inhale 1 Puff every 6 hours as needed for Shortness of Breath. 18 g 3   • Fluticasone Furoate (ARNUITY ELLIPTA) 100 MCG/ACT AEROSOL POWDER, BREATH ACTIVATED Inhale 1 Puff every day for 60 days. 30 Each 2   • fluticasone (FLONASE) 50 MCG/ACT nasal spray Spray 1-2 Sprays in nose 2 times a day as needed. 1 Bottle 1   • cetirizine (ZYRTEC) 10 MG Tab Take 10 mg by mouth every day.     • diphenhydrAMINE HCl (BENADRYL ALLERGY PO) Take 1 Tab by mouth every bedtime.     • Levonorgestrel (MIRENA, 52 MG, IU) by Intrauterine route.       No current [facility]-ordered facility-administered medications on file.       Review of Systems   Constitutional: Negative for chills, fever, malaise/fatigue and weight loss.   Eyes: Negative for blurred vision and double vision.   Respiratory: Positive for shortness of breath and wheezing.    Skin: Negative for itching.   Neurological: Negative for headaches.        History reviewed. No pertinent past medical history.  Past Surgical History:   Procedure Laterality Date   • APPENDECTOMY     • GYN SURGERY      c section x 2   • PRIMARY C SECTION      x2     Social History     Tobacco Use   • Smoking status: Former Smoker     Packs/day: 0.50  "    Years: 20.00     Pack years: 10.00     Types: Cigarettes     Quit date: 12/30/2018     Years since quitting: 3.4   • Smokeless tobacco: Never Used   Vaping Use   • Vaping Use: Never used   Substance Use Topics   • Alcohol use: Not Currently     Comment: Rare   • Drug use: No      Family History   Problem Relation Age of Onset   • Depression Mother    • Depression Father    • No Known Problems Sister    • No Known Problems Brother    • Heart Disease Maternal Grandmother    • Arterial Aneurysm Maternal Grandfather    • Alzheimer's Disease Paternal Grandmother    • COPD Paternal Grandfather    • Lung Disease Paternal Grandfather    • No Known Problems Sister    • Other Brother         CP   • No Known Problems Daughter    • No Known Problems Son      Current Outpatient Medications   Medication Sig Dispense Refill   • FLUoxetine (PROZAC) 10 MG Cap Take 1 Capsule by mouth every day for 180 days. 90 Capsule 1   • albuterol 108 (90 Base) MCG/ACT Aero Soln inhalation aerosol Inhale 1 Puff every 6 hours as needed for Shortness of Breath. 18 g 3   • Fluticasone Furoate (ARNUITY ELLIPTA) 100 MCG/ACT AEROSOL POWDER, BREATH ACTIVATED Inhale 1 Puff every day for 60 days. 30 Each 2   • fluticasone (FLONASE) 50 MCG/ACT nasal spray Spray 1-2 Sprays in nose 2 times a day as needed. 1 Bottle 1   • cetirizine (ZYRTEC) 10 MG Tab Take 10 mg by mouth every day.     • diphenhydrAMINE HCl (BENADRYL ALLERGY PO) Take 1 Tab by mouth every bedtime.     • Levonorgestrel (MIRENA, 52 MG, IU) by Intrauterine route.       No current facility-administered medications for this visit.       Allergies:  Allergies   Allergen Reactions   • Aspirin      Allergy as child, has stayed away from this.         Allergies, past medical history, past surgical history, family history, social history reviewed and updated.    Objective:    /78   Pulse 69   Temp 36.5 °C (97.7 °F) (Temporal)   Resp 16   Ht 1.549 m (5' 1\")   Wt 72.6 kg (160 lb 0.9 oz)   " SpO2 97%   BMI 30.24 kg/m²    Body mass index is 30.24 kg/m².    Physical Exam  Vitals reviewed.   Constitutional:       General: She is not in acute distress.     Appearance: Normal appearance. She is not ill-appearing or toxic-appearing.   HENT:      Head: Normocephalic and atraumatic.   Eyes:      General: No scleral icterus.        Right eye: No discharge.         Left eye: No discharge.      Conjunctiva/sclera: Conjunctivae normal.   Neck:      Vascular: No carotid bruit.   Cardiovascular:      Rate and Rhythm: Normal rate and regular rhythm.      Pulses: Normal pulses.      Heart sounds: Normal heart sounds. No murmur heard.  Pulmonary:      Effort: Pulmonary effort is normal. No respiratory distress.      Breath sounds: Normal breath sounds. No wheezing or rales.   Abdominal:      General: Abdomen is flat. Bowel sounds are normal. There is no distension.      Palpations: Abdomen is soft. There is no mass.      Tenderness: There is no abdominal tenderness. There is no guarding or rebound.   Musculoskeletal:      Cervical back: Neck supple.      Right lower leg: No edema.      Left lower leg: No edema.   Skin:     General: Skin is warm and dry.   Neurological:      General: No focal deficit present.      Mental Status: She is alert.   Psychiatric:         Mood and Affect: Mood normal.         Thought Content: Thought content normal.          Assessment/Plan:    Problem List Items Addressed This Visit     Anxiety    Relevant Medications    FLUoxetine (PROZAC) 10 MG Cap    Mixed hyperlipidemia     Elevated LDL.  ASCVD risk minimal.  Advised Mediterranean diet, exercise.           Asthma     Patient having mild persistent asthma after having COVID.  She is using her albuterol inhaler multiple times a day and about 3 times a week at night.    Plan:   Rx daily ICS inhaler, continue albuterol rescue inhaler as needed.           Relevant Medications    albuterol 108 (90 Base) MCG/ACT Aero Soln inhalation aerosol     Fluticasone Furoate (ARNUITY ELLIPTA) 100 MCG/ACT AEROSOL POWDER, BREATH ACTIVATED    Elevated hemoglobin (HCC)     Relative polycythemia secondary to dehydration as she was fasting for these labs most likely.  No concerning symptoms at this time except for some vague symptoms of hot flashes.    Plan:  Patient to return to care in 2 months, repeat CBC and further testing if necessary.  Consider perimenopausal lab work-up.                  Return in about 2 months (around 8/23/2022) for symptoms, labs.

## 2022-08-26 ENCOUNTER — APPOINTMENT (OUTPATIENT)
Dept: MEDICAL GROUP | Facility: PHYSICIAN GROUP | Age: 42
End: 2022-08-26
Payer: COMMERCIAL

## 2022-09-22 ENCOUNTER — OFFICE VISIT (OUTPATIENT)
Dept: MEDICAL GROUP | Facility: PHYSICIAN GROUP | Age: 42
End: 2022-09-22
Payer: COMMERCIAL

## 2022-09-22 VITALS
DIASTOLIC BLOOD PRESSURE: 80 MMHG | OXYGEN SATURATION: 97 % | RESPIRATION RATE: 15 BRPM | TEMPERATURE: 97.8 F | BODY MASS INDEX: 28.71 KG/M2 | HEIGHT: 62 IN | HEART RATE: 67 BPM | SYSTOLIC BLOOD PRESSURE: 122 MMHG | WEIGHT: 156 LBS

## 2022-09-22 DIAGNOSIS — E78.2 MIXED HYPERLIPIDEMIA: ICD-10-CM

## 2022-09-22 DIAGNOSIS — J06.9 UPPER RESPIRATORY TRACT INFECTION, UNSPECIFIED TYPE: ICD-10-CM

## 2022-09-22 PROCEDURE — 99214 OFFICE O/P EST MOD 30 MIN: CPT | Performed by: PHYSICIAN ASSISTANT

## 2022-09-22 NOTE — PROGRESS NOTES
CC:  Chief Complaint   Patient presents with    Lab Results     From 6/22/22    Follow-Up     Asthma flare up       HISTORY OF PRESENT ILLNESS: Patient is a 42 y.o. female established patient presenting for recheck  Pt recently had URI which flared her asthma. Having some residual wheezing and mild SOB.   Pt's recent labs indicate elevated lipids.     No problem-specific Assessment & Plan notes found for this encounter.      Patient Active Problem List    Diagnosis Date Noted    Asthma 06/23/2022    Elevated hemoglobin (HCC) 06/23/2022    RUQ pain 12/22/2020    Mixed hyperlipidemia 01/08/2020    Bilateral impacted cerumen 01/08/2020    Anterior cervical adenopathy 12/30/2019    Anxiety 12/30/2019      Allergies:Aspirin    Current Outpatient Medications   Medication Sig Dispense Refill    FLUoxetine (PROZAC) 10 MG Cap Take 1 Capsule by mouth every day for 180 days. 90 Capsule 1    albuterol 108 (90 Base) MCG/ACT Aero Soln inhalation aerosol Inhale 1 Puff every 6 hours as needed for Shortness of Breath. 18 g 3    fluticasone (FLONASE) 50 MCG/ACT nasal spray Spray 1-2 Sprays in nose 2 times a day as needed. 1 Bottle 1    cetirizine (ZYRTEC) 10 MG Tab Take 10 mg by mouth every day.      diphenhydrAMINE HCl (BENADRYL ALLERGY PO) Take 1 Tab by mouth every bedtime.      Levonorgestrel (MIRENA, 52 MG, IU) by Intrauterine route.       No current facility-administered medications for this visit.       Social History     Tobacco Use    Smoking status: Former     Packs/day: 0.50     Years: 20.00     Pack years: 10.00     Types: Cigarettes     Quit date: 12/30/2018     Years since quitting: 3.7    Smokeless tobacco: Never   Vaping Use    Vaping Use: Never used   Substance Use Topics    Alcohol use: Not Currently     Comment: Rare    Drug use: No     Social History     Social History Narrative    Not on file       Family History   Problem Relation Age of Onset    Depression Mother     Depression Father     No Known Problems  "Sister     No Known Problems Brother     Heart Disease Maternal Grandmother     Arterial Aneurysm Maternal Grandfather     Alzheimer's Disease Paternal Grandmother     COPD Paternal Grandfather     Lung Disease Paternal Grandfather     No Known Problems Sister     Other Brother         CP    No Known Problems Daughter     No Known Problems Son         ROS:     - Constitutional:  Negative for fever, chills, unexpected weight change, and fatigue/generalized weakness.    - HEENT:  Negative for headaches, vision changes, hearing changes, ear pain, ear discharge, rhinorrhea, sinus congestion, sore throat, and neck pain.      - Respiratory:Positive for wheezing, sob.  Negative for cough, sputum production, chest congestion,  and crackles.      - Cardiovascular: Negative for chest pain, palpitations, orthopnea, and bilateral lower extremity edema.         Exam:    /80 (BP Location: Left arm, Patient Position: Sitting, BP Cuff Size: Adult)   Pulse 67   Temp 36.6 °C (97.8 °F) (Temporal)   Resp 15   Ht 1.562 m (5' 1.5\")   Wt 70.8 kg (156 lb)   SpO2 97%  Body mass index is 29 kg/m².    General:  Well nourished, well developed female in NAD  Head is grossly normal.  Neck: Supple. Thyroid is not enlarged.  Pulmonary: Clear to auscultation. No ronchi, wheezing or rales  Cardiac: Regular rate and rhythm. No murmurs.  Skin: Warm and dry. No obvious lesions  Neuro: Normal muscle tone. Gait normal. Alert and oriented.  Psych: Normal mood and affect      Please note that this dictation was created using voice recognition software. I have made every reasonable attempt to correct obvious errors, but I expect that there are errors of grammar and possibly content that I did not discover before finalizing the note.        Assessment/Plan:  1. Upper respiratory tract infection, unspecified type  No wheezing. Continue with mucinex. Return if not improving.     2. Mixed hyperlipidemia  Continue to monitor             "

## 2023-01-12 ENCOUNTER — PATIENT MESSAGE (OUTPATIENT)
Dept: MEDICAL GROUP | Facility: PHYSICIAN GROUP | Age: 43
End: 2023-01-12
Payer: COMMERCIAL

## 2023-01-12 RX ORDER — FLUOXETINE 10 MG/1
10 CAPSULE ORAL DAILY
COMMUNITY
End: 2023-01-12 | Stop reason: SDUPTHER

## 2023-01-12 RX ORDER — FLUOXETINE 10 MG/1
10 CAPSULE ORAL DAILY
Qty: 90 CAPSULE | Refills: 1 | Status: SHIPPED | OUTPATIENT
Start: 2023-01-12 | End: 2023-01-17 | Stop reason: SDUPTHER

## 2023-01-12 RX ORDER — ALBUTEROL SULFATE 90 UG/1
1 AEROSOL, METERED RESPIRATORY (INHALATION) EVERY 6 HOURS PRN
Qty: 18 G | Refills: 3 | Status: SHIPPED | OUTPATIENT
Start: 2023-01-12 | End: 2023-12-18 | Stop reason: SDUPTHER

## 2023-01-12 NOTE — PATIENT COMMUNICATION
Received request via: Patient    Was the patient seen in the last year in this department? Yes    Does the patient have an active prescription (recently filled or refills available) for medication(s) requested? No    Does the patient have [facility] and need 100 day supply (blood pressure, diabetes and cholesterol meds only)? Patient does not have SCP    Pt made appt for 1/17 to discuss ear/throat concerns, does not wish to see another provider for sooner date

## 2023-01-17 ENCOUNTER — OFFICE VISIT (OUTPATIENT)
Dept: MEDICAL GROUP | Facility: PHYSICIAN GROUP | Age: 43
End: 2023-01-17
Payer: COMMERCIAL

## 2023-01-17 VITALS
TEMPERATURE: 97 F | SYSTOLIC BLOOD PRESSURE: 120 MMHG | OXYGEN SATURATION: 99 % | DIASTOLIC BLOOD PRESSURE: 82 MMHG | WEIGHT: 160.8 LBS | HEART RATE: 70 BPM | RESPIRATION RATE: 16 BRPM | BODY MASS INDEX: 30.36 KG/M2 | HEIGHT: 61 IN

## 2023-01-17 DIAGNOSIS — Z00.00 PHYSICAL EXAM, ANNUAL: ICD-10-CM

## 2023-01-17 DIAGNOSIS — J02.0 STREP THROAT: ICD-10-CM

## 2023-01-17 DIAGNOSIS — F41.9 ANXIETY: ICD-10-CM

## 2023-01-17 LAB
INT CON NEG: NEGATIVE
INT CON POS: POSITIVE
S PYO AG THROAT QL: POSITIVE

## 2023-01-17 PROCEDURE — 99213 OFFICE O/P EST LOW 20 MIN: CPT | Performed by: PHYSICIAN ASSISTANT

## 2023-01-17 PROCEDURE — 87880 STREP A ASSAY W/OPTIC: CPT | Performed by: PHYSICIAN ASSISTANT

## 2023-01-17 RX ORDER — FLUOXETINE 10 MG/1
10 CAPSULE ORAL DAILY
Qty: 90 CAPSULE | Refills: 1 | Status: SHIPPED | OUTPATIENT
Start: 2023-01-17 | End: 2023-08-02

## 2023-01-17 RX ORDER — FLUCONAZOLE 150 MG/1
150 TABLET ORAL DAILY
Qty: 2 TABLET | Refills: 0 | Status: SHIPPED | OUTPATIENT
Start: 2023-01-17 | End: 2023-01-18

## 2023-01-17 RX ORDER — PENICILLIN V POTASSIUM 500 MG/1
500 TABLET ORAL
Qty: 20 TABLET | Refills: 0 | Status: SHIPPED | OUTPATIENT
Start: 2023-01-17 | End: 2023-01-27

## 2023-01-17 ASSESSMENT — PATIENT HEALTH QUESTIONNAIRE - PHQ9: CLINICAL INTERPRETATION OF PHQ2 SCORE: 0

## 2023-01-17 NOTE — PROGRESS NOTES
CC:  Chief Complaint   Patient presents with    Pharyngitis     X1wk, runny nose, ear ache       HISTORY OF PRESENT ILLNESS: Patient is a 42 y.o. female established patient presenting with sore throat, L ear discomfort x 1 week. No fevers, chills, cough.       No problem-specific Assessment & Plan notes found for this encounter.      Patient Active Problem List    Diagnosis Date Noted    Asthma 2022    Elevated hemoglobin (HCC) 2022    RUQ pain 2020    Mixed hyperlipidemia 2020    Bilateral impacted cerumen 2020    Anterior cervical adenopathy 2019    Anxiety 2019      Allergies:Aspirin    Current Outpatient Medications   Medication Sig Dispense Refill    albuterol 108 (90 Base) MCG/ACT Aero Soln inhalation aerosol Inhale 1 Puff every 6 hours as needed for Shortness of Breath. 18 g 3    FLUoxetine (PROZAC) 10 MG Cap Take 1 Capsule by mouth every day. 90 Capsule 1    fluticasone (FLONASE) 50 MCG/ACT nasal spray Spray 1-2 Sprays in nose 2 times a day as needed. 1 Bottle 1    cetirizine (ZYRTEC) 10 MG Tab Take 10 mg by mouth every day.      diphenhydrAMINE HCl (BENADRYL ALLERGY PO) Take 1 Tab by mouth every bedtime.      Levonorgestrel (MIRENA, 52 MG, IU) by Intrauterine route.       No current facility-administered medications for this visit.       Social History     Tobacco Use    Smoking status: Former     Packs/day: 0.50     Years: 20.00     Pack years: 10.00     Types: Cigarettes     Quit date: 2018     Years since quittin.0    Smokeless tobacco: Never   Vaping Use    Vaping Use: Never used   Substance Use Topics    Alcohol use: Not Currently     Comment: Rare    Drug use: No     Social History     Social History Narrative    Not on file       Family History   Problem Relation Age of Onset    Depression Mother     Depression Father     No Known Problems Sister     No Known Problems Brother     Heart Disease Maternal Grandmother     Arterial Aneurysm Maternal  "Grandfather     Alzheimer's Disease Paternal Grandmother     COPD Paternal Grandfather     Lung Disease Paternal Grandfather     No Known Problems Sister     Other Brother         CP    No Known Problems Daughter     No Known Problems Son         ROS:     - Constitutional:  Negative for fever, chills, unexpected weight change, and fatigue/generalized weakness.    - HEENT: Positive for sore throat, ear pain.  Negative for headaches, vision changes, hearing changes,  ear discharge, rhinorrhea, sinus congestion,  and neck pain.      - Respiratory: Negative for cough, sputum production, chest congestion, dyspnea, wheezing, and crackles.         Exam:    /82   Pulse 70   Temp 36.1 °C (97 °F) (Temporal)   Resp 16   Ht 1.549 m (5' 1\")   Wt 72.9 kg (160 lb 12.8 oz)   SpO2 99%  Body mass index is 30.38 kg/m².    General:  Well nourished, well developed female in NAD  HEENT: pharnyx mildly erythematous, TM normal b/l  Neck: Supple. Thyroid is not enlarged. No lymphadenopapthy  Pulmonary: Clear to auscultation. No ronchi, wheezing or rales  Cardiac: Regular rate and rhythm. No murmurs.  Skin: Warm and dry. No obvious lesions  Neuro: Normal muscle tone. Gait normal. Alert and oriented.  Psych: Normal mood and affect      Please note that this dictation was created using voice recognition software. I have made every reasonable attempt to correct obvious errors, but I expect that there are errors of grammar and possibly content that I did not discover before finalizing the note.    LABS: 1/17/23: Results reviewed and discussed with the patient, questions answered.    Assessment/Plan:    1. Strep throat  Will treat with PCN. Take diflucan for yeast infection that happens with abx.  - POCT Rapid Strep A  - penicillin v potassium (VEETID) 500 MG Tab; Take 1 Tablet by mouth 2 times a day for 10 days.  Dispense: 20 Tablet; Refill: 0  - fluconazole (DIFLUCAN) 150 MG tablet; Take 1 Tablet by mouth every day for 1 dose. May " repeat dose in 72 hours if needed  Dispense: 2 Tablet; Refill: 0    2. Anxiety  Refill sent in   - FLUoxetine (PROZAC) 10 MG Cap; Take 1 Capsule by mouth every day.  Dispense: 90 Capsule; Refill: 1    3. Physical exam, annual  Labs printed  - CBC WITH DIFFERENTIAL; Future  - Comp Metabolic Panel; Future  - HEMOGLOBIN A1C; Future  - Lipid Profile; Future  - TSH WITH REFLEX TO FT4; Future

## 2023-01-23 ENCOUNTER — OFFICE VISIT (OUTPATIENT)
Dept: MEDICAL GROUP | Facility: PHYSICIAN GROUP | Age: 43
End: 2023-01-23
Payer: COMMERCIAL

## 2023-01-23 VITALS
OXYGEN SATURATION: 99 % | SYSTOLIC BLOOD PRESSURE: 120 MMHG | WEIGHT: 160 LBS | DIASTOLIC BLOOD PRESSURE: 72 MMHG | BODY MASS INDEX: 30.21 KG/M2 | HEIGHT: 61 IN | RESPIRATION RATE: 16 BRPM | TEMPERATURE: 97.5 F | HEART RATE: 75 BPM

## 2023-01-23 DIAGNOSIS — R29.810 FACIAL DROOP: ICD-10-CM

## 2023-01-23 PROCEDURE — 99215 OFFICE O/P EST HI 40 MIN: CPT | Performed by: PHYSICIAN ASSISTANT

## 2023-01-23 NOTE — PROGRESS NOTES
CC:  Chief Complaint   Patient presents with    Follow-Up       HISTORY OF PRESENT ILLNESS: Patient is a 42 y.o. female established patient presenting with L sided facial droop that started 2 days ago. Cannot close her L eye and cannot move facial muscles in L side of face. Not weakness or sensory loss in her arms or legs.       No problem-specific Assessment & Plan notes found for this encounter.      Patient Active Problem List    Diagnosis Date Noted    Asthma 2022    Elevated hemoglobin (HCC) 2022    RUQ pain 2020    Mixed hyperlipidemia 2020    Bilateral impacted cerumen 2020    Anterior cervical adenopathy 2019    Anxiety 2019      Allergies:Aspirin    Current Outpatient Medications   Medication Sig Dispense Refill    FLUoxetine (PROZAC) 10 MG Cap Take 1 Capsule by mouth every day. 90 Capsule 1    penicillin v potassium (VEETID) 500 MG Tab Take 1 Tablet by mouth 2 times a day for 10 days. 20 Tablet 0    albuterol 108 (90 Base) MCG/ACT Aero Soln inhalation aerosol Inhale 1 Puff every 6 hours as needed for Shortness of Breath. 18 g 3    fluticasone (FLONASE) 50 MCG/ACT nasal spray Spray 1-2 Sprays in nose 2 times a day as needed. 1 Bottle 1    cetirizine (ZYRTEC) 10 MG Tab Take 10 mg by mouth every day.      diphenhydrAMINE HCl (BENADRYL ALLERGY PO) Take 1 Tab by mouth every bedtime.      Levonorgestrel (MIRENA, 52 MG, IU) by Intrauterine route.       No current facility-administered medications for this visit.       Social History     Tobacco Use    Smoking status: Former     Packs/day: 0.50     Years: 20.00     Pack years: 10.00     Types: Cigarettes     Quit date: 2018     Years since quittin.0    Smokeless tobacco: Never   Vaping Use    Vaping Use: Never used   Substance Use Topics    Alcohol use: Not Currently     Comment: Rare    Drug use: No     Social History     Social History Narrative    Not on file       Family History   Problem Relation Age of  "Onset    Depression Mother     Depression Father     No Known Problems Sister     No Known Problems Brother     Heart Disease Maternal Grandmother     Arterial Aneurysm Maternal Grandfather     Alzheimer's Disease Paternal Grandmother     COPD Paternal Grandfather     Lung Disease Paternal Grandfather     No Known Problems Sister     Other Brother         CP    No Known Problems Daughter     No Known Problems Son         ROS:     - Constitutional:  Negative for fever, chills, unexpected weight change, and fatigue/generalized weakness.    - HEENT:  Negative for headaches, vision changes, hearing changes, ear pain, ear discharge, rhinorrhea, sinus congestion, sore throat, and neck pain.        - Neurological:Positive for focal weakness. Negative for dizziness, tingling, tremors, focal sensory deficit,  and headaches.        Exam:    /72   Pulse 75   Temp 36.4 °C (97.5 °F) (Temporal)   Resp 16   Ht 1.549 m (5' 1\")   Wt 72.6 kg (160 lb)   SpO2 99%  Body mass index is 30.23 kg/m².    General:  Well nourished, well developed female in NAD  HEENT: Positive for L sided facial weakness with very mild forehead sparing.   Neck: Supple. Thyroid is not enlarged.  Skin: Warm and dry. No obvious lesions  Neuro: Normal muscle tone. Gait normal. Alert and oriented. No notable UE/LE weakness  Psych: Normal mood and affect      Please note that this dictation was created using voice recognition software. I have made every reasonable attempt to correct obvious errors, but I expect that there are errors of grammar and possibly content that I did not discover before finalizing the note.        Assessment/Plan:    1. Facial droop  I advised pt she needs to be seen in the ER to have a CT scan done to rule out CVA. Also explained that it could be Bell's palsy.           "

## 2023-07-18 ENCOUNTER — OFFICE VISIT (OUTPATIENT)
Dept: MEDICAL GROUP | Facility: PHYSICIAN GROUP | Age: 43
End: 2023-07-18
Payer: COMMERCIAL

## 2023-07-18 VITALS
BODY MASS INDEX: 30.39 KG/M2 | DIASTOLIC BLOOD PRESSURE: 76 MMHG | HEART RATE: 75 BPM | WEIGHT: 160.94 LBS | SYSTOLIC BLOOD PRESSURE: 116 MMHG | RESPIRATION RATE: 16 BRPM | HEIGHT: 61 IN | OXYGEN SATURATION: 97 % | TEMPERATURE: 96.9 F

## 2023-07-18 DIAGNOSIS — N30.01 ACUTE CYSTITIS WITH HEMATURIA: ICD-10-CM

## 2023-07-18 DIAGNOSIS — R35.0 URINARY FREQUENCY: ICD-10-CM

## 2023-07-18 LAB
APPEARANCE UR: CLEAR
BILIRUB UR STRIP-MCNC: NORMAL MG/DL
COLOR UR AUTO: YELLOW
GLUCOSE UR STRIP.AUTO-MCNC: NORMAL MG/DL
KETONES UR STRIP.AUTO-MCNC: NORMAL MG/DL
LEUKOCYTE ESTERASE UR QL STRIP.AUTO: NORMAL
NITRITE UR QL STRIP.AUTO: NORMAL
PH UR STRIP.AUTO: 7 [PH] (ref 5–8)
PROT UR QL STRIP: NORMAL MG/DL
RBC UR QL AUTO: NORMAL
SP GR UR STRIP.AUTO: 1.01
UROBILINOGEN UR STRIP-MCNC: 0.2 MG/DL

## 2023-07-18 PROCEDURE — 3078F DIAST BP <80 MM HG: CPT | Performed by: STUDENT IN AN ORGANIZED HEALTH CARE EDUCATION/TRAINING PROGRAM

## 2023-07-18 PROCEDURE — 3074F SYST BP LT 130 MM HG: CPT | Performed by: STUDENT IN AN ORGANIZED HEALTH CARE EDUCATION/TRAINING PROGRAM

## 2023-07-18 PROCEDURE — 99213 OFFICE O/P EST LOW 20 MIN: CPT | Performed by: STUDENT IN AN ORGANIZED HEALTH CARE EDUCATION/TRAINING PROGRAM

## 2023-07-18 PROCEDURE — 81002 URINALYSIS NONAUTO W/O SCOPE: CPT | Performed by: STUDENT IN AN ORGANIZED HEALTH CARE EDUCATION/TRAINING PROGRAM

## 2023-07-18 RX ORDER — NITROFURANTOIN 25; 75 MG/1; MG/1
100 CAPSULE ORAL EVERY 12 HOURS
Qty: 10 CAPSULE | Refills: 0 | Status: SHIPPED | OUTPATIENT
Start: 2023-07-18 | End: 2023-07-23

## 2023-07-18 ASSESSMENT — FIBROSIS 4 INDEX: FIB4 SCORE: 0.7

## 2023-07-18 NOTE — PROGRESS NOTES
"Chief Complaint   Patient presents with    UTI     Frequent urination, burning x yesterday        HPI:  Symptom onset: 2 days ago   Current symptoms: Painful, urgent, frequent voids. No gross blood noted in urine.  Since onset symptoms are: Unchanged  Treatments tried: OTC antispasm med, hydration  Associated symptoms: Negative for fever, flank pain, nausea and vomiting, vaginal discharge, pelvic pain.      ROS:  Denies fever, chills, vomiting or abdominal pain.     OBJECTIVE:  /76 (BP Location: Left arm, Patient Position: Sitting, BP Cuff Size: Adult)   Pulse 75   Temp 36.1 °C (96.9 °F) (Temporal)   Resp 16   Ht 1.549 m (5' 1\")   Wt 73 kg (160 lb 15 oz)   SpO2 97%   Gen: Alert, NAD.  Chest: Lungs clear to auscultation, CV RRR.  Abdomen: Soft, tender in suprapubic region. No CVAT. Normal bowel sounds.  Ext: no edema     Lab Results   Component Value Date    POCCOLOR Yellow 07/18/2023    POCAPPEAR Clear 07/18/2023    POCLEUKEST Small 07/18/2023    POCNITRITE NEG 07/18/2023    POCUROBILIGE 0.2 07/18/2023    POCPROTEIN NEG 07/18/2023    POCURPH 7.0 07/18/2023    POCBLOOD Moderate 07/18/2023    POCSPGRV 1.015 07/18/2023    POCKETONES NEG 07/18/2023    POCBILIRUBIN NEG 07/18/2023    POCGLUCUA NEG 07/18/2023          ASSESSMENT/PLAN:     Problem List Items Addressed This Visit    None  Visit Diagnoses       Urinary frequency        Relevant Orders    POCT Urinalysis (Completed)    Acute cystitis with hematuria        Relevant Medications    nitrofurantoin (MACROBID) 100 MG Cap            1. Abnormal urine dipstick in office: pos leuk bernice, blood. Neg nitrite. Will treat empirically with macrobid  2. Provided education to drink plenty of fluids, wipe front to back every void and bowel movement.   3. Return to clinic if symptoms not improving within 3-4 days or in case of vomiting, fever, increasing pain.    "

## 2023-07-18 NOTE — PATIENT INSTRUCTIONS
Urinary Tract Infection, Adult  A urinary tract infection (UTI) is an infection of any part of the urinary tract. The urinary tract includes:  The kidneys.  The ureters.  The bladder.  The urethra.  These organs make, store, and get rid of pee (urine) in the body.  What are the causes?  This infection is caused by germs (bacteria) in your genital area. These germs grow and cause swelling (inflammation) of your urinary tract.  What increases the risk?  The following factors may make you more likely to develop this condition:  Using a small, thin tube (catheter) to drain pee.  Not being able to control when you pee or poop (incontinence).  Being female. If you are female, these things can increase the risk:  Using these methods to prevent pregnancy:  A medicine that kills sperm (spermicide).  A device that blocks sperm (diaphragm).  Having low levels of a female hormone (estrogen).  Being pregnant.  You are more likely to develop this condition if:  You have genes that add to your risk.  You are sexually active.  You take antibiotic medicines.  You have trouble peeing because of:  A prostate that is bigger than normal, if you are male.  A blockage in the part of your body that drains pee from the bladder.  A kidney stone.  A nerve condition that affects your bladder.  Not getting enough to drink.  Not peeing often enough.  You have other conditions, such as:  Diabetes.  A weak disease-fighting system (immune system).  Sickle cell disease.  Gout.  Injury of the spine.  What are the signs or symptoms?  Symptoms of this condition include:  Needing to pee right away.  Peeing small amounts often.  Pain or burning when peeing.  Blood in the pee.  Pee that smells bad or not like normal.  Trouble peeing.  Pee that is cloudy.  Fluid coming from the vagina, if you are female.  Pain in the belly or lower back.  Other symptoms include:  Vomiting.  Not feeling hungry.  Feeling mixed up (confused). This may be the first symptom in  older adults.  Being tired and grouchy (irritable).  A fever.  Watery poop (diarrhea).  How is this treated?  Taking antibiotic medicine.  Taking other medicines.  Drinking enough water.  In some cases, you may need to see a specialist.  Follow these instructions at home:    Medicines  Take over-the-counter and prescription medicines only as told by your doctor.  If you were prescribed an antibiotic medicine, take it as told by your doctor. Do not stop taking it even if you start to feel better.  General instructions  Make sure you:  Pee until your bladder is empty.  Do not hold pee for a long time.  Empty your bladder after sex.  Wipe from front to back after peeing or pooping if you are a female. Use each tissue one time when you wipe.  Drink enough fluid to keep your pee pale yellow.  Keep all follow-up visits.  Contact a doctor if:  You do not get better after 1-2 days.  Your symptoms go away and then come back.  Get help right away if:  You have very bad back pain.  You have very bad pain in your lower belly.  You have a fever.  You have chills.  You feeling like you will vomit or you vomit.  Summary  A urinary tract infection (UTI) is an infection of any part of the urinary tract.  This condition is caused by germs in your genital area.  There are many risk factors for a UTI.  Treatment includes antibiotic medicines.  Drink enough fluid to keep your pee pale yellow.  This information is not intended to replace advice given to you by your health care provider. Make sure you discuss any questions you have with your health care provider.  Document Revised: 07/30/2021 Document Reviewed: 07/30/2021  Elsevier Patient Education © 2023 [employer]

## 2023-07-18 NOTE — PROGRESS NOTES
HISTORY OF PRESENT ILLNESS: Trisha is a pleasant 42 y.o. female, {new/est:17923} patient who presents today to discuss medical problems as listed below:    No problems updated.     Current Outpatient Medications Ordered in Epic   Medication Sig Dispense Refill    FLUoxetine (PROZAC) 10 MG Cap Take 1 Capsule by mouth every day. 90 Capsule 1    albuterol 108 (90 Base) MCG/ACT Aero Soln inhalation aerosol Inhale 1 Puff every 6 hours as needed for Shortness of Breath. 18 g 3    cetirizine (ZYRTEC) 10 MG Tab Take 10 mg by mouth every day.      diphenhydrAMINE HCl (BENADRYL ALLERGY PO) Take 1 Tab by mouth every bedtime.      Levonorgestrel (MIRENA, 52 MG, IU) by Intrauterine route.       No current [facility]-ordered facility-administered medications on file.       Review of systems:  Per HPI    Past Medical History:   Diagnosis Date    Asthma due to environmental allergies      Past Surgical History:   Procedure Laterality Date    APPENDECTOMY      GYN SURGERY      c section x 2    PRIMARY C SECTION      x2     Social History     Tobacco Use    Smoking status: Former     Packs/day: 0.50     Years: 20.00     Pack years: 10.00     Types: Cigarettes     Quit date: 2018     Years since quittin.5    Smokeless tobacco: Never   Vaping Use    Vaping Use: Never used   Substance Use Topics    Alcohol use: Not Currently     Comment: Rare    Drug use: Yes     Types: Inhaled      Family History   Problem Relation Age of Onset    Depression Mother     Depression Father     No Known Problems Sister     No Known Problems Brother     Heart Disease Maternal Grandmother     Arterial Aneurysm Maternal Grandfather     Alzheimer's Disease Paternal Grandmother     COPD Paternal Grandfather     Lung Disease Paternal Grandfather     No Known Problems Sister     Other Brother         CP    No Known Problems Daughter     No Known Problems Son      Current Outpatient Medications   Medication Sig Dispense Refill    FLUoxetine (PROZAC) 10 MG Cap  "Take 1 Capsule by mouth every day. 90 Capsule 1    albuterol 108 (90 Base) MCG/ACT Aero Soln inhalation aerosol Inhale 1 Puff every 6 hours as needed for Shortness of Breath. 18 g 3    cetirizine (ZYRTEC) 10 MG Tab Take 10 mg by mouth every day.      diphenhydrAMINE HCl (BENADRYL ALLERGY PO) Take 1 Tab by mouth every bedtime.      Levonorgestrel (MIRENA, 52 MG, IU) by Intrauterine route.       No current facility-administered medications for this visit.       Allergies:  Allergies   Allergen Reactions    Aspirin      Allergy as child, has stayed away from this.         Allergies, past medical history, past surgical history, family history, social history reviewed and updated.    Objective:    /76 (BP Location: Left arm, Patient Position: Sitting, BP Cuff Size: Adult)   Pulse 75   Temp 36.1 °C (96.9 °F) (Temporal)   Resp 16   Ht 1.549 m (5' 1\")   Wt 73 kg (160 lb 15 oz)   SpO2 97%   BMI 30.41 kg/m²    Body mass index is 30.41 kg/m².    Physical exam:  General: Normal appearance, no acute distress, not ill-appearing  HEENT: EOM intact, conjunctiva normal limits, negative right/left eye discharge.  Sclera anicteric  Cardiovascular: Normal rate and rhythm, no murmurs  Pulmonary: No respiratory distress, no wheezing, no rales, breath sounds normal.  Abdomen: Bowel sounds normal, flat, soft.  Musculoskeletal: No edema bilaterally  Skin: Warm, dry, no lesions  Neurological: No focal deficits, normal gait  Psychiatric: Mood within normal limits    Assessment/Plan:    Problem List Items Addressed This Visit    None  Visit Diagnoses       Urinary frequency        Relevant Orders    POCT Urinalysis             No follow-ups on file. ***    I spent a total of *** minutes with record review, exam, communication with the patient, communication with other providers, and documentation of this encounter.  "

## 2023-08-02 DIAGNOSIS — F41.9 ANXIETY: ICD-10-CM

## 2023-08-02 RX ORDER — FLUOXETINE 10 MG/1
10 CAPSULE ORAL DAILY
Qty: 90 CAPSULE | Refills: 1 | Status: SHIPPED | OUTPATIENT
Start: 2023-08-02 | End: 2023-12-18 | Stop reason: SDUPTHER

## 2023-08-02 NOTE — TELEPHONE ENCOUNTER
Received request via: Pharmacy    Was the patient seen in the last year in this department? Yes    Does the patient have an active prescription (recently filled or refills available) for medication(s) requested? No    Does the patient have longterm Plus and need 100 day supply (blood pressure, diabetes and cholesterol meds only)? Patient does not have SCP    Last office visit 07/18/23  Last labs 1/23/23

## 2023-12-18 DIAGNOSIS — F41.9 ANXIETY: ICD-10-CM

## 2023-12-19 RX ORDER — ALBUTEROL SULFATE 90 UG/1
1 AEROSOL, METERED RESPIRATORY (INHALATION) EVERY 6 HOURS PRN
Qty: 18 G | Refills: 3 | Status: SHIPPED | OUTPATIENT
Start: 2023-12-19

## 2023-12-19 RX ORDER — FLUOXETINE 10 MG/1
10 CAPSULE ORAL DAILY
Qty: 90 CAPSULE | Refills: 1 | Status: SHIPPED | OUTPATIENT
Start: 2023-12-19

## 2024-05-07 ENCOUNTER — OFFICE VISIT (OUTPATIENT)
Dept: MEDICAL GROUP | Facility: PHYSICIAN GROUP | Age: 44
End: 2024-05-07
Payer: COMMERCIAL

## 2024-05-07 VITALS
WEIGHT: 158.51 LBS | TEMPERATURE: 97.8 F | DIASTOLIC BLOOD PRESSURE: 96 MMHG | OXYGEN SATURATION: 98 % | BODY MASS INDEX: 29.93 KG/M2 | HEART RATE: 89 BPM | RESPIRATION RATE: 14 BRPM | SYSTOLIC BLOOD PRESSURE: 148 MMHG | HEIGHT: 61 IN

## 2024-05-07 DIAGNOSIS — I10 PRIMARY HYPERTENSION: ICD-10-CM

## 2024-05-07 DIAGNOSIS — Z13.220 LIPID SCREENING: ICD-10-CM

## 2024-05-07 PROCEDURE — 99214 OFFICE O/P EST MOD 30 MIN: CPT | Performed by: STUDENT IN AN ORGANIZED HEALTH CARE EDUCATION/TRAINING PROGRAM

## 2024-05-07 PROCEDURE — 3077F SYST BP >= 140 MM HG: CPT | Performed by: STUDENT IN AN ORGANIZED HEALTH CARE EDUCATION/TRAINING PROGRAM

## 2024-05-07 PROCEDURE — 3080F DIAST BP >= 90 MM HG: CPT | Performed by: STUDENT IN AN ORGANIZED HEALTH CARE EDUCATION/TRAINING PROGRAM

## 2024-05-07 RX ORDER — AMLODIPINE BESYLATE 5 MG/1
5 TABLET ORAL DAILY
Qty: 60 TABLET | Refills: 0 | Status: SHIPPED | OUTPATIENT
Start: 2024-05-07

## 2024-05-07 RX ORDER — METOPROLOL SUCCINATE 25 MG/1
25 TABLET, EXTENDED RELEASE ORAL PRN
Qty: 1 TABLET | Refills: 0 | Status: SHIPPED | OUTPATIENT
Start: 2024-05-07

## 2024-05-07 ASSESSMENT — PATIENT HEALTH QUESTIONNAIRE - PHQ9: CLINICAL INTERPRETATION OF PHQ2 SCORE: 0

## 2024-05-07 ASSESSMENT — FIBROSIS 4 INDEX: FIB4 SCORE: 0.72

## 2024-05-08 NOTE — PROGRESS NOTES
Verbal Consent given for [employer] recording software    HISTORY OF PRESENT ILLNESS: Trisha is a pleasant 43 y.o. female, established patient who presents today to discuss medical problems as listed below:    History of Present Illness  The patient is a 43-year-old female here for an acute visit.    The patient recently underwent a dental extraction, during which an elevated blood pressure reading was observed and the procedure was held. She does not possess a home blood pressure monitor. Approximately 15 years ago, she was told she had elevated bp following the birth of her daughter. During her bout with Bell's palsy, her blood pressure was also elevated. Currently, she is experiencing stress due to dental issues. She denies experiencing chest pain, significant dyspnea, lower extremity edema, dizziness, or blurred vision. Her dental procedure is scheduled for 06/04/2024. . She reports experiencing anxiety when going to the dentist office.    She used to smoke but quit 3 months ago.       Current Outpatient Medications Ordered in Epic   Medication Sig Dispense Refill    amLODIPine (NORVASC) 5 MG Tab Take 1 Tablet by mouth every day. 60 Tablet 0    metoprolol SR (TOPROL XL) 25 MG TABLET SR 24 HR Take 1 Tablet by mouth as needed (anxiety). 1 Tablet 0    FLUoxetine (PROZAC) 10 MG Cap Take 1 Capsule by mouth every day. 90 Capsule 1    albuterol 108 (90 Base) MCG/ACT Aero Soln inhalation aerosol Inhale 1 Puff every 6 hours as needed for Shortness of Breath. 18 g 3    cetirizine (ZYRTEC) 10 MG Tab Take 10 mg by mouth every day.      diphenhydrAMINE HCl (BENADRYL ALLERGY PO) Take 1 Tab by mouth every bedtime.      Levonorgestrel (MIRENA, 52 MG, IU) by Intrauterine route.       No current [facility]-ordered facility-administered medications on file.       Review of systems:  Per HPI    Patient Active Problem List    Diagnosis Date Noted    Asthma 06/23/2022    Elevated hemoglobin (HCC) 06/23/2022    RUQ pain 12/22/2020    Mixed  hyperlipidemia 2020    Bilateral impacted cerumen 2020    Anterior cervical adenopathy 2019    Anxiety 2019     Past Surgical History:   Procedure Laterality Date    APPENDECTOMY      GYN SURGERY      c section x 2    PRIMARY C SECTION      x2     Social History     Tobacco Use    Smoking status: Former     Current packs/day: 0.00     Average packs/day: 0.5 packs/day for 20.0 years (10.0 ttl pk-yrs)     Types: Cigarettes     Start date: 1998     Quit date: 2018     Years since quittin.3    Smokeless tobacco: Never   Vaping Use    Vaping Use: Never used   Substance Use Topics    Alcohol use: Not Currently     Comment: Rare    Drug use: Yes     Types: Inhaled      Family History   Problem Relation Age of Onset    Depression Mother     Depression Father     No Known Problems Sister     No Known Problems Brother     Heart Disease Maternal Grandmother     Arterial Aneurysm Maternal Grandfather     Alzheimer's Disease Paternal Grandmother     COPD Paternal Grandfather     Lung Disease Paternal Grandfather     No Known Problems Sister     Other Brother         CP    No Known Problems Daughter     No Known Problems Son      Current Outpatient Medications   Medication Sig Dispense Refill    amLODIPine (NORVASC) 5 MG Tab Take 1 Tablet by mouth every day. 60 Tablet 0    metoprolol SR (TOPROL XL) 25 MG TABLET SR 24 HR Take 1 Tablet by mouth as needed (anxiety). 1 Tablet 0    FLUoxetine (PROZAC) 10 MG Cap Take 1 Capsule by mouth every day. 90 Capsule 1    albuterol 108 (90 Base) MCG/ACT Aero Soln inhalation aerosol Inhale 1 Puff every 6 hours as needed for Shortness of Breath. 18 g 3    cetirizine (ZYRTEC) 10 MG Tab Take 10 mg by mouth every day.      diphenhydrAMINE HCl (BENADRYL ALLERGY PO) Take 1 Tab by mouth every bedtime.      Levonorgestrel (MIRENA, 52 MG, IU) by Intrauterine route.       No current facility-administered medications for this visit.       Allergies:  Allergies  "  Allergen Reactions    Aspirin      Allergy as child, has stayed away from this.         Allergies, past medical history, past surgical history, family history, social history reviewed and updated.    Objective:    BP (!) 148/96   Pulse 89   Temp 36.6 °C (97.8 °F) (Temporal)   Resp 14   Ht 1.549 m (5' 1\")   Wt 71.9 kg (158 lb 8.2 oz)   SpO2 98%   BMI 29.95 kg/m²    Body mass index is 29.95 kg/m².    Physical exam:  General: Normal appearance, no acute distress, not ill-appearing  HEENT: EOM intact, conjunctiva normal limits, negative right/left eye discharge.  Sclera anicteric  Cardiovascular: Normal rate and rhythm, no murmurs  Pulmonary: No respiratory distress, no wheezing, no rales, breath sounds normal.  Musculoskeletal: No edema bilaterally  Skin: Warm, dry, no lesions  Neurological: No focal deficits, normal gait  Psychiatric: Mood within normal limits    Assessment/Plan:    Assessment & Plan  1. Hypertension.  148/96 today  Pressures at dental office were reading in the 180 systolic range.   The patient is advised to acquire a home blood pressure monitor. A prescription for amlodipine 5 mg has been issued.    The patient is instructed to monitor for any leg swelling as a side effect of amlodipine.    On the day of her dental procedure due to severe anxiety causing significant elevation in blood pressures I think one time dose of metoprolol prior to the procedure would be helpful to help decrease her blood pressure so they can proceed with the extractions.     A baseline EKG would be recommended at the next visit for initial diagnosis of htn.     Labs ordered      Follow-up  The patient is scheduled for a follow-up appointment with [facility] in 4 weeks to monitor her blood pressure.       Problem List Items Addressed This Visit    None  Visit Diagnoses       Primary hypertension        Relevant Medications    amLODIPine (NORVASC) 5 MG Tab    metoprolol SR (TOPROL XL) 25 MG TABLET SR 24 HR    Other " Relevant Orders    CBC WITHOUT DIFFERENTIAL    Basic Metabolic Panel    TSH WITH REFLEX TO FT4    Lipid screening        Relevant Orders    Lipid Profile            Return in about 4 weeks (around 6/4/2024), or if symptoms worsen or fail to improve, for blood pressure.

## 2024-06-17 ENCOUNTER — APPOINTMENT (OUTPATIENT)
Dept: MEDICAL GROUP | Facility: PHYSICIAN GROUP | Age: 44
End: 2024-06-17
Payer: COMMERCIAL

## 2024-06-25 RX ORDER — AMLODIPINE BESYLATE 5 MG/1
5 TABLET ORAL DAILY
Qty: 60 TABLET | Refills: 0 | Status: SHIPPED | OUTPATIENT
Start: 2024-06-25

## 2024-06-25 NOTE — TELEPHONE ENCOUNTER
Received request via: Patient    Was the patient seen in the last year in this department? Yes    Does the patient have an active prescription (recently filled or refills available) for medication(s) requested? No    Pharmacy Name: [facility]     Does the patient have [facility] Plus and need 100 day supply (blood pressure, diabetes and cholesterol meds only)? Patient does not have SCP

## 2024-06-26 ENCOUNTER — APPOINTMENT (OUTPATIENT)
Dept: MEDICAL GROUP | Facility: PHYSICIAN GROUP | Age: 44
End: 2024-06-26
Payer: COMMERCIAL

## 2024-07-16 ENCOUNTER — APPOINTMENT (OUTPATIENT)
Dept: MEDICAL GROUP | Facility: PHYSICIAN GROUP | Age: 44
End: 2024-07-16
Payer: COMMERCIAL

## 2024-11-06 ENCOUNTER — APPOINTMENT (OUTPATIENT)
Dept: RADIOLOGY | Facility: IMAGING CENTER | Age: 44
End: 2024-11-06
Payer: COMMERCIAL

## 2024-11-06 ENCOUNTER — APPOINTMENT (OUTPATIENT)
Dept: MEDICAL GROUP | Facility: PHYSICIAN GROUP | Age: 44
End: 2024-11-06
Payer: COMMERCIAL

## 2024-11-06 ENCOUNTER — OFFICE VISIT (OUTPATIENT)
Dept: URGENT CARE | Facility: CLINIC | Age: 44
End: 2024-11-06
Payer: COMMERCIAL

## 2024-11-06 VITALS
DIASTOLIC BLOOD PRESSURE: 72 MMHG | SYSTOLIC BLOOD PRESSURE: 128 MMHG | HEIGHT: 61 IN | OXYGEN SATURATION: 99 % | TEMPERATURE: 99.9 F | WEIGHT: 158 LBS | HEART RATE: 97 BPM | BODY MASS INDEX: 29.83 KG/M2 | RESPIRATION RATE: 16 BRPM

## 2024-11-06 DIAGNOSIS — Z87.09 HISTORY OF ASTHMA: ICD-10-CM

## 2024-11-06 DIAGNOSIS — R05.1 ACUTE COUGH: ICD-10-CM

## 2024-11-06 DIAGNOSIS — J18.9 PNEUMONIA OF RIGHT LUNG DUE TO INFECTIOUS ORGANISM, UNSPECIFIED PART OF LUNG: ICD-10-CM

## 2024-11-06 DIAGNOSIS — R06.2 WHEEZING: ICD-10-CM

## 2024-11-06 PROCEDURE — 71046 X-RAY EXAM CHEST 2 VIEWS: CPT | Mod: TC | Performed by: RADIOLOGY

## 2024-11-06 PROCEDURE — 99214 OFFICE O/P EST MOD 30 MIN: CPT

## 2024-11-06 RX ORDER — BENZONATATE 100 MG/1
100 CAPSULE ORAL 3 TIMES DAILY PRN
Qty: 60 CAPSULE | Refills: 0 | Status: SHIPPED | OUTPATIENT
Start: 2024-11-06

## 2024-11-06 RX ORDER — PREDNISONE 10 MG/1
TABLET ORAL
COMMUNITY
Start: 2024-11-03

## 2024-11-06 RX ORDER — OSELTAMIVIR PHOSPHATE 75 MG/1
CAPSULE ORAL
COMMUNITY
Start: 2024-11-03

## 2024-11-06 RX ORDER — ALBUTEROL SULFATE 0.83 MG/ML
2.5 SOLUTION RESPIRATORY (INHALATION) EVERY 6 HOURS PRN
Qty: 30 EACH | Refills: 0 | Status: SHIPPED | OUTPATIENT
Start: 2024-11-06

## 2024-11-06 RX ORDER — DEXTROMETHORPHAN HYDROBROMIDE AND PROMETHAZINE HYDROCHLORIDE 15; 6.25 MG/5ML; MG/5ML
5 SYRUP ORAL NIGHTLY PRN
Qty: 80 ML | Refills: 0 | Status: SHIPPED | OUTPATIENT
Start: 2024-11-06

## 2024-11-06 RX ORDER — ALBUTEROL SULFATE 90 UG/1
1-2 INHALANT RESPIRATORY (INHALATION) EVERY 6 HOURS PRN
Qty: 8.5 G | Refills: 0 | Status: SHIPPED | OUTPATIENT
Start: 2024-11-06

## 2024-11-06 RX ORDER — DOXYCYCLINE 100 MG/1
100 CAPSULE ORAL 2 TIMES DAILY
Qty: 10 CAPSULE | Refills: 0 | Status: SHIPPED | OUTPATIENT
Start: 2024-11-06 | End: 2024-11-11

## 2024-11-06 ASSESSMENT — FIBROSIS 4 INDEX: FIB4 SCORE: 0.74

## 2024-11-06 NOTE — PROGRESS NOTES
Subjective     Trisha Noguera is a 44 y.o. female who presents with cough and fever x7 days.     HPI:   Trisha is a 43yo female presenting for cough and fever x7 days. Reports associated phlegm production with cough, vomiting, chills, and intermittent wheezing. She was recently seen virtually and completed a full course of oseltamivir and prednisone. Denies abdominal pain or diarrhea. Tmax 99.9. Denies increased work of breathing. Pertinent history of asthma. Denies dysphagia or difficulty managing oral secretions. No rash. She has also attempted Tylenol and ibuprofen for relief. Denies headache or vision changes.         Review of Systems   Constitutional:  Positive for chills, fever and malaise/fatigue.   HENT:  Positive for congestion. Negative for ear discharge, ear pain, sinus pain and sore throat.    Eyes:  Negative for blurred vision, double vision, photophobia, pain, discharge and redness.   Respiratory:  Positive for cough, sputum production and wheezing (intermittent). Negative for stridor.    Cardiovascular:  Negative for chest pain, palpitations and leg swelling.   Gastrointestinal:  Positive for vomiting. Negative for abdominal pain, diarrhea and nausea.   Musculoskeletal:  Positive for myalgias. Negative for neck pain.   Skin:  Negative for rash.   Neurological:  Negative for dizziness, tingling, sensory change, speech change, focal weakness, seizures, loss of consciousness, weakness and headaches.     Past Medical History:   Diagnosis Date    Asthma due to environmental allergies      Past Surgical History:   Procedure Laterality Date    APPENDECTOMY      GYN SURGERY      c section x 2    PRIMARY C SECTION      x2     Allergies: Aspirin     Current Outpatient Medications:     amLODIPine (NORVASC) 5 MG Tab, Take 1 Tablet by mouth every day., Disp: 60 Tablet, Rfl: 0    metoprolol SR (TOPROL XL) 25 MG TABLET SR 24 HR, Take 1 Tablet by mouth as needed (anxiety)., Disp: 1 Tablet, Rfl: 0     cetirizine  "(ZYRTEC) 10 MG Tab, Take 10 mg by mouth every day., Disp: , Rfl:     Levonorgestrel (MIRENA, 52 MG, IU), by Intrauterine route., Disp: , Rfl:      Social History     Tobacco Use    Smoking status: Former     Current packs/day: 0.00     Average packs/day: 0.5 packs/day for 20.0 years (10.0 ttl pk-yrs)     Types: Cigarettes     Start date: 1998     Quit date: 2018     Years since quittin.8    Smokeless tobacco: Never   Vaping Use    Vaping status: Never Used   Substance Use Topics    Alcohol use: Not Currently     Comment: Rare    Drug use: Yes     Types: Inhaled     Family History   Problem Relation Age of Onset    Depression Mother     Depression Father     No Known Problems Sister     No Known Problems Brother     Heart Disease Maternal Grandmother     Arterial Aneurysm Maternal Grandfather     Alzheimer's Disease Paternal Grandmother     COPD Paternal Grandfather     Lung Disease Paternal Grandfather     No Known Problems Sister     Other Brother         CP    No Known Problems Daughter     No Known Problems Son      Medications, Allergies, and current problem list reviewed today in Epic.      Objective     /72   Pulse 97   Temp 37.7 °C (99.9 °F)   Resp 16   Ht 1.549 m (5' 1\")   Wt 71.7 kg (158 lb)   SpO2 99%   BMI 29.85 kg/m²      Physical Exam  Vitals reviewed.   Constitutional:       General: She is not in acute distress.  HENT:      Head: No right periorbital erythema or left periorbital erythema.      Jaw: There is normal jaw occlusion. No tenderness, swelling, pain on movement or malocclusion.      Right Ear: Tympanic membrane, ear canal and external ear normal.      Left Ear: Tympanic membrane, ear canal and external ear normal.      Nose: Congestion present.      Right Turbinates: Not enlarged or swollen.      Left Turbinates: Not enlarged or swollen.      Right Sinus: No maxillary sinus tenderness or frontal sinus tenderness.      Left Sinus: No maxillary sinus tenderness or " frontal sinus tenderness.      Mouth/Throat:      Lips: No lesions.      Mouth: Mucous membranes are moist. No oral lesions or angioedema.      Tongue: No lesions. Tongue does not deviate from midline.      Palate: No mass and lesions.      Pharynx: Uvula midline. Posterior oropharyngeal erythema present. No oropharyngeal exudate or uvula swelling.   Eyes:      General: Vision grossly intact. Gaze aligned appropriately. No visual field deficit.     Extraocular Movements: Extraocular movements intact.      Conjunctiva/sclera: Conjunctivae normal.      Pupils: Pupils are equal, round, and reactive to light.      Right eye: Pupil is round, reactive and not sluggish.      Left eye: Pupil is round, reactive and not sluggish.      Funduscopic exam:     Right eye: Red reflex present.         Left eye: Red reflex present.  Neck:      Trachea: Trachea normal. No abnormal tracheal secretions or tracheal deviation.   Cardiovascular:      Rate and Rhythm: Normal rate and regular rhythm.      Pulses: Normal pulses.      Heart sounds: Normal heart sounds.   Pulmonary:      Effort: Pulmonary effort is normal. No tachypnea, accessory muscle usage, prolonged expiration, respiratory distress or retractions.      Breath sounds: No stridor, decreased air movement or transmitted upper airway sounds. Examination of the right-upper field reveals wheezing and rales. Examination of the left-upper field reveals wheezing. Examination of the right-middle field reveals rales. Wheezing and rales present. No rhonchi.   Musculoskeletal:         General: Normal range of motion.      Cervical back: Full passive range of motion without pain, normal range of motion and neck supple. No erythema, rigidity, tenderness or crepitus. No pain with movement. Normal range of motion.      Right lower leg: No edema.      Left lower leg: No edema.   Lymphadenopathy:      Cervical: No cervical adenopathy.   Skin:     General: Skin is warm and dry.      Findings:  No rash.   Neurological:      General: No focal deficit present.      Mental Status: She is alert. Mental status is at baseline.      Cranial Nerves: Cranial nerves 2-12 are intact.      Sensory: Sensation is intact.      Motor: Motor function is intact.      Coordination: Coordination is intact.      Gait: Gait is intact.   Psychiatric:         Mood and Affect: Mood normal.         Behavior: Behavior normal.         Thought Content: Thought content normal.       DX-CHEST-2 VIEWS    Result Date: 11/6/2024 11/6/2024 3:34 PM HISTORY/REASON FOR EXAM: Cough for 7 days. TECHNIQUE/EXAM DESCRIPTION AND NUMBER OF VIEWS: Two views of the chest. COMPARISON: None. FINDINGS: There is patchy consolidation within the right lung base. There is linear atelectasis within the left lung base. The heart is normal in size. There is no evidence of pleural effusion. Soft tissues and bony structures are unremarkable.     1.  Patchy consolidation within the right lung base likely representing pneumonia. 2.  Linear atelectasis within the left lung base.       Assessment & Plan     1. Acute cough   - DX-CHEST-2 VIEWS; Future  - benzonatate (TESSALON) 100 MG Cap; Take 1 Capsule by mouth 3 times a day as needed for Cough.  Dispense: 60 Capsule; Refill: 0  - promethazine-dextromethorphan (PROMETHAZINE-DM) 6.25-15 MG/5ML syrup; Take 5 mL by mouth at bedtime as needed for Cough.  Dispense: 80 mL; Refill: 0    2. Pneumonia of right lung due to infectious organism, unspecified part of lung   - doxycycline (MONODOX) 100 MG capsule; Take 1 Capsule by mouth 2 times a day for 5 days.  Dispense: 10 Capsule; Refill: 0    3. History of asthma   - albuterol 108 (90 Base) MCG/ACT Aero Soln inhalation aerosol; Inhale 1-2 Puffs every 6 hours as needed for Shortness of Breath.  Dispense: 8.5 g; Refill: 0  - albuterol (PROVENTIL) 2.5mg/3ml Nebu Soln solution for nebulization; Take 3 mL by nebulization every 6 hours as needed for Shortness of Breath.  Dispense:  30 Each; Refill: 0    4. Wheezing   - albuterol 108 (90 Base) MCG/ACT Aero Soln inhalation aerosol; Inhale 1-2 Puffs every 6 hours as needed for Shortness of Breath.  Dispense: 8.5 g; Refill: 0  - albuterol (PROVENTIL) 2.5mg/3ml Nebu Soln solution for nebulization; Take 3 mL by nebulization every 6 hours as needed for Shortness of Breath.  Dispense: 30 Each; Refill: 0       MDM/Comments:   Patient with evidence of right lung pneumonia. Vital signs stable and patient non-toxic appearing. CXR with patchy consolidation within the right lung base.   Patient prescribed doxycycline for pneumonia. Wheezing auscultated during examination. Recent treatment with prednisone. Will treat with Albuterol PRN.      Advised patient to go to the emergency room if any new or worsening symptoms present. Patient verbalizes understanding. Strict return precautions given.     Patient advised to follow up with PCP or return post antibiotic therapy for assessment of pneumonia resolution.     Recommended supportive treatment at home:     - Use a humidifier, especially at night. Cold or warm water humidifiers have the same effect.  - Last Med squeeze bottle sinus rinses or plain nasal saline twice a day.  - Hot tea + honey + fresh lemon juice  - Frequent hand washing, rest, hydration  - Warm salt water gargles at least twice a day       Follow up with primary care provider. Follow up urgently for worsening symptoms, persistent fevers, facial swelling, visual changes, weakness, elevated heart rate, stiff neck, prolonged cough, persistent wheezing, leg swelling, or any other concerns. Seek emergency medical care immediately for: Trouble breathing, persistent pain or pressure in the chest, confusion, inability to wake or stay awake, bluish lips or face, persistent tachycardia (fast heart rate), prolonged dizziness, persistent high grade fevers.      Illness progression and alarm symptoms discussed with patient, emphasizing low threshold for  returning to clinic/emergency department for worsening symptoms. Patient is agreeable to the plan and verbalizes understanding, and will follow up if warranted.        Differential diagnosis, natural history, supportive care, and indications for immediate follow-up discussed.       Follow-up as needed if symptoms worsen or fail to improve to PCP, Urgent care or Emergency Room.       I personally reviewed prior external notes and test results pertinent to today's visit.  I have independently reviewed and interpreted all diagnostics ordered during this urgent care visit.                    Electronically signed by ROQUE Monterroso

## 2024-11-06 NOTE — LETTER
[facility] URGENT CARE [facility]  [address] [address]     November 6, 2024    Patient: Trisha Noguera   YOB: 1980   Date of Visit: 11/6/2024       To Whom It May Concern:    Trisha Noguera was seen and treated in our department on 11/6/2024.     Please excuse Trisha from work 11/6/24-11/8/24.         Sincerely,     ANDRE Goodwin.

## 2024-11-07 ASSESSMENT — ENCOUNTER SYMPTOMS
EYE PAIN: 0
SPUTUM PRODUCTION: 1
VOMITING: 1
STRIDOR: 0
SPEECH CHANGE: 0
DIZZINESS: 0
SINUS PAIN: 0
PHOTOPHOBIA: 0
LOSS OF CONSCIOUSNESS: 0
SENSORY CHANGE: 0
WHEEZING: 1
SEIZURES: 0
SORE THROAT: 0
WEAKNESS: 0
FOCAL WEAKNESS: 0
EYE REDNESS: 0
EYE DISCHARGE: 0
FEVER: 1
HEADACHES: 0
COUGH: 1
CHILLS: 1
MYALGIAS: 1
NAUSEA: 0
NECK PAIN: 0
PALPITATIONS: 0
DIARRHEA: 0
TINGLING: 0
DOUBLE VISION: 0
BLURRED VISION: 0
ABDOMINAL PAIN: 0

## 2024-11-07 ASSESSMENT — VISUAL ACUITY: OU: 1

## 2025-03-06 ENCOUNTER — OFFICE VISIT (OUTPATIENT)
Dept: MEDICAL GROUP | Facility: PHYSICIAN GROUP | Age: 45
End: 2025-03-06
Payer: COMMERCIAL

## 2025-03-06 VITALS
TEMPERATURE: 98.2 F | DIASTOLIC BLOOD PRESSURE: 64 MMHG | BODY MASS INDEX: 31.49 KG/M2 | OXYGEN SATURATION: 96 % | SYSTOLIC BLOOD PRESSURE: 128 MMHG | WEIGHT: 166.8 LBS | HEIGHT: 61 IN | HEART RATE: 85 BPM

## 2025-03-06 DIAGNOSIS — Z12.11 SCREENING FOR COLORECTAL CANCER: ICD-10-CM

## 2025-03-06 DIAGNOSIS — Z12.12 SCREENING FOR COLORECTAL CANCER: ICD-10-CM

## 2025-03-06 DIAGNOSIS — Z87.09 HISTORY OF ASTHMA: ICD-10-CM

## 2025-03-06 DIAGNOSIS — R06.2 WHEEZING: ICD-10-CM

## 2025-03-06 DIAGNOSIS — H69.92 EUSTACHIAN TUBE DISORDER, LEFT: ICD-10-CM

## 2025-03-06 DIAGNOSIS — Z12.31 ENCOUNTER FOR SCREENING MAMMOGRAM FOR BREAST CANCER: ICD-10-CM

## 2025-03-06 PROCEDURE — 3078F DIAST BP <80 MM HG: CPT | Performed by: NURSE PRACTITIONER

## 2025-03-06 PROCEDURE — 3074F SYST BP LT 130 MM HG: CPT | Performed by: NURSE PRACTITIONER

## 2025-03-06 PROCEDURE — 99214 OFFICE O/P EST MOD 30 MIN: CPT | Performed by: NURSE PRACTITIONER

## 2025-03-06 RX ORDER — ALBUTEROL SULFATE 90 UG/1
1-2 INHALANT RESPIRATORY (INHALATION) EVERY 6 HOURS PRN
Qty: 8.5 G | Refills: 0 | Status: SHIPPED | OUTPATIENT
Start: 2025-03-06

## 2025-03-06 ASSESSMENT — ENCOUNTER SYMPTOMS
CHILLS: 0
FEVER: 0
HEADACHES: 0
SINUS PAIN: 0
WEIGHT LOSS: 0
SHORTNESS OF BREATH: 0
PALPITATIONS: 0
DIZZINESS: 0
SORE THROAT: 1

## 2025-03-06 ASSESSMENT — PATIENT HEALTH QUESTIONNAIRE - PHQ9: CLINICAL INTERPRETATION OF PHQ2 SCORE: 0

## 2025-03-06 NOTE — PROGRESS NOTES
"CC:  Chief Complaint   Patient presents with    Other     Pt has had left ear and throat pain x1 week negative strep        HISTORY OF PRESENT ILLNESS: Patient is a 44 y.o. female established patient presenting     Patient was recently seen at urgent care on 2/3/2025 for sore throat, body aches chest and nasal congestion and ear pain that was going on for 2 weeks.  Patient was then started on Augmentin for 7 days.  She was negative for flu COVID and strep and RSV.  Today she comes in with complaints of continued fullness to her left ear and occasional dizziness however does note that the congestion has improved and she continues to have some left-sided sore throat.  She continues to take Benadryl at night, Zyrtec during the day and use Flonase with minimal improvement.  She is also tried the Valsalva maneuver to see if she can change the pressure in her left ear with minimal improvement.     Review of Systems   Constitutional:  Negative for chills, fever, malaise/fatigue and weight loss.   HENT:  Positive for hearing loss and sore throat. Negative for congestion and sinus pain.    Respiratory:  Negative for shortness of breath.    Cardiovascular:  Negative for chest pain and palpitations.   Neurological:  Negative for dizziness and headaches.             - NOTE: All other systems reviewed and are negative, except as in HPI.      Exam:    /64 (BP Location: Left arm, Patient Position: Sitting, BP Cuff Size: Large adult)   Pulse 85   Temp 36.8 °C (98.2 °F)   Ht 1.549 m (5' 1\")   Wt 75.7 kg (166 lb 12.8 oz)   SpO2 96%  Body mass index is 31.52 kg/m².    Physical Exam  Constitutional:       General: She is not in acute distress.     Appearance: Normal appearance. She is normal weight. She is not ill-appearing.   HENT:      Head: Normocephalic and atraumatic.      Right Ear: Tympanic membrane, ear canal and external ear normal.      Left Ear: Ear canal and external ear normal. A middle ear effusion is present. " No mastoid tenderness. Tympanic membrane is not injected, scarred, perforated, erythematous, retracted or bulging.   Neurological:      Mental Status: She is alert.           Assessment/Plan:    1. Eustachian tube disorder, left  Acute uncomplicated condition  Discussed with patient that this could take a few weeks to months for changes in this but to start on Sudafed and do a referral to ENT  - Referral to ENT    2. History of asthma  Chronic and stable condition  - albuterol 108 (90 Base) MCG/ACT Aero Soln inhalation aerosol; Inhale 1-2 Puffs every 6 hours as needed for Shortness of Breath.  Dispense: 8.5 g; Refill: 0    3. Wheezing  Chronic and stable condition  - albuterol 108 (90 Base) MCG/ACT Aero Soln inhalation aerosol; Inhale 1-2 Puffs every 6 hours as needed for Shortness of Breath.  Dispense: 8.5 g; Refill: 0    4. Encounter for screening mammogram for breast cancer  - MA-SCREENING MAMMO BILAT W/CAD; Future    5. Screening for colorectal cancer  - Cologuard® colon cancer screening     Discussed with patient possible alternative diagnoses, patient is to take all medications as prescribed.     If symptoms persist FU w/PCP, if symptoms worsen go to emergency room.     If experiencing any side effects from prescribed medications reports to the office immediately or go to emergency room.    Reviewed indication, dosage, usage and potential adverse effects of prescribed medications.     Reviewed risks and benefits of treatment plan. Patient verbalizes understanding of all instruction and verbally agrees to plan.      Return for Follow-up as needed.      Please note that this dictation was created using voice recognition software. I have made every reasonable attempt to correct obvious errors, but I expect that there are errors of grammar and possibly content that I did not discover before finalizing the note.

## 2025-03-12 NOTE — Clinical Note
REFERRAL APPROVAL NOTICE         Sent on March 12, 2025                   Trisha Noguera  [address] [place] [address]                   Dear Ms. Noguera,    After a careful review of the medical information and benefit coverage, [facility] has processed your referral. See below for additional details.    If applicable, you must be actively enrolled with your insurance for coverage of the authorized service. If you have any questions regarding your coverage, please contact your insurance directly.    REFERRAL INFORMATION   Referral #:  [id number]  Referred-To Provider    Referred-By Provider:  Otolaryngology    ZEINA Wadsworth LEAH J      [address]  [facility] [id number] [phone] [address] [address]  [phone]    Referral Start Date:  03/06/2025  Referral End Date:   03/06/2026             SCHEDULING  If you do not already have an appointment, please call [phone] to make an appointment.     MORE INFORMATION  If you do not already have a [employer] account, sign up at: [email or web address]  You can access your medical information, make appointments, see lab results, billing information, and more.  If you have questions regarding this referral, please contact  the [facility] Referrals department at:             [phone]. Monday - Friday 8:00AM - 5:00PM.     Sincerely,    [facility]

## 2025-04-07 ENCOUNTER — HOSPITAL ENCOUNTER (OUTPATIENT)
Dept: RADIOLOGY | Facility: MEDICAL CENTER | Age: 45
End: 2025-04-07
Attending: NURSE PRACTITIONER
Payer: COMMERCIAL

## 2025-04-07 DIAGNOSIS — Z12.31 ENCOUNTER FOR SCREENING MAMMOGRAM FOR BREAST CANCER: ICD-10-CM

## 2025-04-07 PROCEDURE — 77067 SCR MAMMO BI INCL CAD: CPT

## 2025-04-08 ENCOUNTER — RESULTS FOLLOW-UP (OUTPATIENT)
Dept: MEDICAL GROUP | Facility: PHYSICIAN GROUP | Age: 45
End: 2025-04-08

## 2025-05-20 ENCOUNTER — OFFICE VISIT (OUTPATIENT)
Dept: MEDICAL GROUP | Facility: PHYSICIAN GROUP | Age: 45
End: 2025-05-20
Payer: COMMERCIAL

## 2025-05-20 VITALS
HEART RATE: 88 BPM | TEMPERATURE: 98.8 F | WEIGHT: 171 LBS | SYSTOLIC BLOOD PRESSURE: 140 MMHG | BODY MASS INDEX: 32.28 KG/M2 | RESPIRATION RATE: 14 BRPM | HEIGHT: 61 IN | OXYGEN SATURATION: 96 % | DIASTOLIC BLOOD PRESSURE: 80 MMHG

## 2025-05-20 DIAGNOSIS — Z11.59 ENCOUNTER FOR HEPATITIS C SCREENING TEST FOR LOW RISK PATIENT: Primary | ICD-10-CM

## 2025-05-20 DIAGNOSIS — Z00.00 PHYSICAL EXAM, ANNUAL: ICD-10-CM

## 2025-05-20 DIAGNOSIS — R06.2 WHEEZING: ICD-10-CM

## 2025-05-20 DIAGNOSIS — Z87.09 HISTORY OF ASTHMA: ICD-10-CM

## 2025-05-20 DIAGNOSIS — J45.30 MILD PERSISTENT ASTHMA WITHOUT COMPLICATION: ICD-10-CM

## 2025-05-20 PROCEDURE — 3077F SYST BP >= 140 MM HG: CPT | Performed by: PHYSICIAN ASSISTANT

## 2025-05-20 PROCEDURE — 99214 OFFICE O/P EST MOD 30 MIN: CPT | Performed by: PHYSICIAN ASSISTANT

## 2025-05-20 PROCEDURE — 3079F DIAST BP 80-89 MM HG: CPT | Performed by: PHYSICIAN ASSISTANT

## 2025-05-20 RX ORDER — ALBUTEROL SULFATE 0.83 MG/ML
2.5 SOLUTION RESPIRATORY (INHALATION) EVERY 6 HOURS PRN
Qty: 30 EACH | Refills: 0 | Status: SHIPPED | OUTPATIENT
Start: 2025-05-20

## 2025-05-20 RX ORDER — FLUTICASONE PROPIONATE AND SALMETEROL 100; 50 UG/1; UG/1
1 POWDER RESPIRATORY (INHALATION) EVERY 12 HOURS
Qty: 60 EACH | Refills: 3 | Status: SHIPPED | OUTPATIENT
Start: 2025-05-20

## 2025-05-20 RX ORDER — CETIRIZINE HYDROCHLORIDE 10 MG/1
10 TABLET ORAL DAILY
COMMUNITY

## 2025-05-20 RX ORDER — FLUTICASONE PROPIONATE 50 MCG
1 SPRAY, SUSPENSION (ML) NASAL DAILY
COMMUNITY

## 2025-05-20 RX ORDER — ALBUTEROL SULFATE 90 UG/1
1-2 INHALANT RESPIRATORY (INHALATION) EVERY 6 HOURS PRN
Qty: 8.5 G | Refills: 0 | Status: SHIPPED | OUTPATIENT
Start: 2025-05-20

## 2025-05-20 NOTE — PROGRESS NOTES
"CC:  Chief Complaint   Patient presents with    Medication Refill     Albuterol        HISTORY OF PRESENT ILLNESS: Patient is a 44 y.o. female established patient presenting with issues below  Pt got pneumonia about six months ago. Currently having increased dyspnea when allergies are worsened especially around this time of year. Having wheezing. Has been using albuterol TID.     Current Medications[1]     ROS:     ROS    Exam:    BP (!) 140/80   Pulse 88   Temp 37.1 °C (98.8 °F) (Temporal)   Resp 14   Ht 1.549 m (5' 1\")   Wt 77.6 kg (171 lb)   SpO2 96%  Body mass index is 32.31 kg/m².    General:  Well nourished, well developed female in NAD  Head is grossly normal.  Neck: Supple.   Pulmonary: Clear to auscultation. No ronchi, wheezing or rales  Cardiac: Regular rate and rhythm. No murmurs.  Skin: Warm and dry. No obvious lesions  Neuro: Normal muscle tone. Gait normal. Alert and oriented.  Psych: Normal mood and affect      Please note that this dictation was created using voice recognition software. I have made every reasonable attempt to correct obvious errors, but I expect that there are errors of grammar and possibly content that I did not discover before finalizing the note.        Assessment/Plan:    1. Encounter for hepatitis C screening test for low risk patient (Primary)    - HEP C VIRUS ANTIBODY; Future    2. Physical exam, annual  Labs printed.  She is will set up appointment for Pap smear with Valeria Darden  - CBC WITH DIFFERENTIAL; Future  - Comp Metabolic Panel; Future  - HEMOGLOBIN A1C; Future  - Lipid Profile; Future  - TSH WITH REFLEX TO FT4; Future    3. Mild persistent asthma without complication      4. History of asthma  Refill of albuterol sent in.  Trial on Advair 100/50 mcg  - albuterol (PROVENTIL) 2.5mg/3ml Nebu Soln solution for nebulization; Take 3 mL by nebulization every 6 hours as needed for Shortness of Breath.  Dispense: 30 Each; Refill: 0  - albuterol 108 (90 Base) MCG/ACT " Aero Soln inhalation aerosol; Inhale 1-2 Puffs every 6 hours as needed for Shortness of Breath.  Dispense: 8.5 g; Refill: 0  - fluticasone-salmeterol (ADVAIR) 100-50 MCG/ACT AEROSOL POWDER, BREATH ACTIVATED; Inhale 1 Puff every 12 hours.  Dispense: 60 Each; Refill: 3    5. Wheezing    - albuterol (PROVENTIL) 2.5mg/3ml Nebu Soln solution for nebulization; Take 3 mL by nebulization every 6 hours as needed for Shortness of Breath.  Dispense: 30 Each; Refill: 0  - albuterol 108 (90 Base) MCG/ACT Aero Soln inhalation aerosol; Inhale 1-2 Puffs every 6 hours as needed for Shortness of Breath.  Dispense: 8.5 g; Refill: 0  - fluticasone-salmeterol (ADVAIR) 100-50 MCG/ACT AEROSOL POWDER, BREATH ACTIVATED; Inhale 1 Puff every 12 hours.  Dispense: 60 Each; Refill: 3                [1]   Current Outpatient Medications   Medication Sig Dispense Refill    albuterol 108 (90 Base) MCG/ACT Aero Soln inhalation aerosol Inhale 1-2 Puffs every 6 hours as needed for Shortness of Breath. 8.5 g 0    albuterol (PROVENTIL) 2.5mg/3ml Nebu Soln solution for nebulization Take 3 mL by nebulization every 6 hours as needed for Shortness of Breath. 30 Each 0    amLODIPine (NORVASC) 5 MG Tab Take 1 Tablet by mouth every day. (Patient not taking: Reported on 5/20/2025) 60 Tablet 0    metoprolol SR (TOPROL XL) 25 MG TABLET SR 24 HR Take 1 Tablet by mouth as needed (anxiety). (Patient not taking: Reported on 5/20/2025) 1 Tablet 0    Levonorgestrel (MIRENA, 52 MG, IU) by Intrauterine route. (Patient not taking: Reported on 5/20/2025)       No current facility-administered medications for this visit.

## 2025-06-18 ENCOUNTER — PATIENT MESSAGE (OUTPATIENT)
Dept: MEDICAL GROUP | Facility: PHYSICIAN GROUP | Age: 45
End: 2025-06-18
Payer: COMMERCIAL

## 2025-06-18 RX ORDER — FLUTICASONE PROPIONATE AND SALMETEROL XINAFOATE 115; 21 UG/1; UG/1
2 AEROSOL, METERED RESPIRATORY (INHALATION) 2 TIMES DAILY
COMMUNITY
End: 2025-06-18 | Stop reason: SDUPTHER

## 2025-06-18 NOTE — PATIENT COMMUNICATION
Received request via: Patient    Was the patient seen in the last year in this department? Yes    Does the patient have an active prescription (recently filled or refills available) for medication(s) requested? No    Pharm[facility] Name[facility]     Does the patient have [facility] and need 100-day supply? (This applies to ALL medications) Patient does not have SCP

## 2025-06-19 RX ORDER — FLUTICASONE PROPIONATE AND SALMETEROL XINAFOATE 115; 21 UG/1; UG/1
2 AEROSOL, METERED RESPIRATORY (INHALATION) 2 TIMES DAILY
Qty: 12 G | Refills: 1 | Status: SHIPPED | OUTPATIENT
Start: 2025-06-19